# Patient Record
Sex: FEMALE | Race: WHITE | Employment: PART TIME | URBAN - METROPOLITAN AREA
[De-identification: names, ages, dates, MRNs, and addresses within clinical notes are randomized per-mention and may not be internally consistent; named-entity substitution may affect disease eponyms.]

---

## 2017-03-27 ENCOUNTER — OFFICE VISIT (OUTPATIENT)
Dept: SURGERY | Age: 47
End: 2017-03-27

## 2017-03-27 ENCOUNTER — HOSPITAL ENCOUNTER (OUTPATIENT)
Dept: LAB | Age: 47
Discharge: HOME | End: 2017-03-27
Payer: MEDICARE

## 2017-03-27 VITALS
HEART RATE: 72 BPM | DIASTOLIC BLOOD PRESSURE: 68 MMHG | HEIGHT: 66 IN | BODY MASS INDEX: 31.37 KG/M2 | RESPIRATION RATE: 16 BRPM | SYSTOLIC BLOOD PRESSURE: 136 MMHG | WEIGHT: 195.2 LBS

## 2017-03-27 DIAGNOSIS — M35.00 SJOGRENS SYNDROME (HCC): ICD-10-CM

## 2017-03-27 DIAGNOSIS — K90.9 INTESTINAL MALABSORPTION, UNSPECIFIED TYPE: Primary | ICD-10-CM

## 2017-03-27 DIAGNOSIS — I73.00 RAYNAUD'S PHENOMENON WITHOUT GANGRENE: ICD-10-CM

## 2017-03-27 DIAGNOSIS — M79.7 FIBROMYALGIA: ICD-10-CM

## 2017-03-27 DIAGNOSIS — Z98.84 S/P BARIATRIC SURGERY: ICD-10-CM

## 2017-03-27 LAB
ALBUMIN SERPL BCP-MCNC: 3.9 G/DL (ref 3.4–5)
ALBUMIN/GLOB SERPL: 1.1 {RATIO} (ref 0.8–1.7)
ALP SERPL-CCNC: 63 U/L (ref 45–117)
ALT SERPL-CCNC: 18 U/L (ref 13–56)
ANION GAP BLD CALC-SCNC: 9 MMOL/L (ref 3–18)
AST SERPL W P-5'-P-CCNC: 10 U/L (ref 15–37)
BASOPHILS # BLD AUTO: 0 K/UL (ref 0–0.06)
BASOPHILS # BLD: 0 % (ref 0–2)
BILIRUB SERPL-MCNC: 0.3 MG/DL (ref 0.2–1)
BUN SERPL-MCNC: 15 MG/DL (ref 7–18)
BUN/CREAT SERPL: 23 (ref 12–20)
CALCIUM SERPL-MCNC: 8.8 MG/DL (ref 8.5–10.1)
CHLORIDE SERPL-SCNC: 104 MMOL/L (ref 100–108)
CO2 SERPL-SCNC: 29 MMOL/L (ref 21–32)
CREAT SERPL-MCNC: 0.66 MG/DL (ref 0.6–1.3)
DIFFERENTIAL METHOD BLD: ABNORMAL
EOSINOPHIL # BLD: 0 K/UL (ref 0–0.4)
EOSINOPHIL NFR BLD: 1 % (ref 0–5)
ERYTHROCYTE [DISTWIDTH] IN BLOOD BY AUTOMATED COUNT: 17.2 % (ref 11.6–14.5)
FERRITIN SERPL-MCNC: 4 NG/ML (ref 8–388)
FOLATE SERPL-MCNC: >20 NG/ML (ref 3.1–17.5)
GLOBULIN SER CALC-MCNC: 3.7 G/DL (ref 2–4)
GLUCOSE SERPL-MCNC: 122 MG/DL (ref 74–99)
HCT VFR BLD AUTO: 34.6 % (ref 35–45)
HGB BLD-MCNC: 11.1 G/DL (ref 12–16)
IRON SERPL-MCNC: 28 UG/DL (ref 50–175)
LYMPHOCYTES # BLD AUTO: 31 % (ref 21–52)
LYMPHOCYTES # BLD: 2.5 K/UL (ref 0.9–3.6)
MCH RBC QN AUTO: 25.8 PG (ref 24–34)
MCHC RBC AUTO-ENTMCNC: 32.1 G/DL (ref 31–37)
MCV RBC AUTO: 80.5 FL (ref 74–97)
MONOCYTES # BLD: 0.7 K/UL (ref 0.05–1.2)
MONOCYTES NFR BLD AUTO: 8 % (ref 3–10)
NEUTS SEG # BLD: 4.8 K/UL (ref 1.8–8)
NEUTS SEG NFR BLD AUTO: 60 % (ref 40–73)
PLATELET # BLD AUTO: 295 K/UL (ref 135–420)
PMV BLD AUTO: 9.9 FL (ref 9.2–11.8)
POTASSIUM SERPL-SCNC: 3.7 MMOL/L (ref 3.5–5.5)
PROT SERPL-MCNC: 7.6 G/DL (ref 6.4–8.2)
RBC # BLD AUTO: 4.3 M/UL (ref 4.2–5.3)
SODIUM SERPL-SCNC: 142 MMOL/L (ref 136–145)
VIT B12 SERPL-MCNC: 404 PG/ML (ref 211–911)
WBC # BLD AUTO: 8 K/UL (ref 4.6–13.2)

## 2017-03-27 PROCEDURE — 82306 VITAMIN D 25 HYDROXY: CPT | Performed by: SPECIALIST

## 2017-03-27 PROCEDURE — 82728 ASSAY OF FERRITIN: CPT | Performed by: SPECIALIST

## 2017-03-27 PROCEDURE — 85025 COMPLETE CBC W/AUTO DIFF WBC: CPT | Performed by: SPECIALIST

## 2017-03-27 PROCEDURE — 82607 VITAMIN B-12: CPT | Performed by: SPECIALIST

## 2017-03-27 PROCEDURE — 83540 ASSAY OF IRON: CPT | Performed by: SPECIALIST

## 2017-03-27 PROCEDURE — 84425 ASSAY OF VITAMIN B-1: CPT | Performed by: SPECIALIST

## 2017-03-27 PROCEDURE — 36415 COLL VENOUS BLD VENIPUNCTURE: CPT | Performed by: SPECIALIST

## 2017-03-27 PROCEDURE — 80053 COMPREHEN METABOLIC PANEL: CPT | Performed by: SPECIALIST

## 2017-03-27 RX ORDER — ASCORBIC ACID 250 MG
TABLET ORAL
COMMUNITY

## 2017-03-27 RX ORDER — BISMUTH SUBSALICYLATE 262 MG
1 TABLET,CHEWABLE ORAL DAILY
COMMUNITY

## 2017-03-27 RX ORDER — MELATONIN
DAILY
COMMUNITY

## 2017-03-27 RX ORDER — MAGNESIUM 200 MG
1000 TABLET ORAL DAILY
COMMUNITY

## 2017-03-27 RX ORDER — ZOLPIDEM TARTRATE 10 MG/1
TABLET ORAL
COMMUNITY
End: 2017-10-19

## 2017-03-27 NOTE — PROGRESS NOTES
Revision Surgery Consultation    Subjective: The patient is a 40 y.o. obese female with a Body mass index is 31.51 kg/(m^2). Carlos Matamoros The patient had a laparoscopic gastric bypass procedure done approximatly 8 years ago in [place] by Dr. Ronn Lozada. her starting weight prior to surgery was 288. she ultimately lost approximately 128 lbs with a subsequent weight regain of 35lbs. her peak EBWL at 2 years out from surgery was 84% and now her current EBWL is 61%. her last bariatric follow-up was 3 years ago with Dr. Ronn Lozada. Norma Mccoy notes that she had pneumonia  in the immediate post-op phase with a long hospitalization related to that. she currently is having the following issues related to his health: new autoimmune problems. she is here today to discuss some of her weight gain issues. All of their prior evaluations available by both their PCP's and specialists physicians have been reviewed today either in the Care Everywhere portal or scanned under the media tab. I have spent a large portion of my initial consultation today reviewing the patients current dietary habits which have contributed to their health issues, weight regain and  their current obesity. They understand that generally speaking,  weight regain is  a function of resuming less that ideal dietary habits instead of being a procedural issue. They understand that older procedures are more likely to be associated with a less that perfect procedural result, such as a prior vertical banded gastroplasty or non divided gastric bypass. These procedures are more likely to result in staple line failures with resultant weight regain. This has been explained to the patient via diagrams of these older procedures and given to the patient. I have suggested to them personally a dietary regimen that they can initiate now to help with their status as it pertains to their weight.   They understand that the most important aspect of their journey through their weight loss endeavor will be their adherence to a new lifestyle of healthy eating behavior. They also understand that an adherence to an exercise program will not only help with weight loss but is ultimately important in weight maintenance. Patient Active Problem List    Diagnosis Date Noted    Intestinal malabsorption     Fibromyalgia     Raynaud phenomenon     Sjogrens syndrome ([address]     S/P bariatric surgery       Past Surgical History:   Procedure Laterality Date    BREAST SURGERY PROCEDURE UNLISTED      breast reduction    HX GYN      ABEBE single oophorectomy    HX HEENT      tonsils    HX ORTHOPAEDIC      left knee atrthroscopy, Rt rotator cuff, left elbow    LAP GASTRIC BYPASS/REGULO-EN-Y      2/4/2009      Social History   Substance Use Topics    Smoking status: Never Smoker    Smokeless tobacco: Never Used    Alcohol use Yes      Comment: social      No family history on file. Current Outpatient Prescriptions   Medication Sig Dispense Refill    CETIRIZINE HCL (ZYRTEC PO) Take  by mouth.  multivitamin (ONE A DAY) tablet Take 1 Tab by mouth daily.  calcium-cholecalciferol, d3, (CALCIUM 600 + D) 600-125 mg-unit tab Take  by mouth.  ascorbic acid, vitamin C, (VITAMIN C) 250 mg tablet Take  by mouth.  cyanocobalamin (VITAMIN B-12) 1,000 mcg sublingual tablet Take 1,000 mcg by mouth daily.  cholecalciferol (VITAMIN D3) 1,000 unit tablet Take  by mouth daily.  B.infantis-B.ani-B.long-B.bifi (PROBIOTIC 4X) 10-15 mg TbEC Take  by mouth.  zolpidem (AMBIEN) 10 mg tablet Take  by mouth nightly as needed for Sleep.        Allergies   Allergen Reactions    Erythromycin Anaphylaxis    Seldane-D Anaphylaxis     Coma     Sulfa (Sulfonamide Antibiotics) Anaphylaxis     Susana Spencer Syndrome          Review of Systems:            General - No history or complaints of unexpected fever, chills, or weight loss  Head/Neck - No history or complaints of headache, diplopia, dysphagia, hearing loss  Cardiac - No history or complaints of chest pain, palpitations, murmur, or shortness of breath  Pulmonary - No history or complaints of shortness of breath, productive cough, hemoptysis  Gastrointestinal - No history or complaints of reflux,  abdominal pain, obstipation/constipation, blood per rectum  Genitourinary - No history or complaints of hematuria/dysuria, stress urinary incontinence symptoms, or renal lithiasis  Musculoskeletal - No history or complaints of joint pain or muscular weakness  Hematologic - No history or complaints of bleeding disorders, blood transfusions, sickle cell anemia  Neurologic - No history or complaints of  migraine headaches, seizure activity, syncopal episodes, TIA or stroke  Integumentary - No history or complaints of rashes, abnormal nevi, skin cancer  Gynecological - No history of heavy menses/abnormal menses           Objective:     Visit Vitals    /68 (BP 1 Location: Left arm)    Pulse 72    Resp 16    Ht 5' 6\" (1.676 m)    Wt 88.5 kg (195 lb 3.2 oz)    BMI 31.51 kg/m2       Physical Examination: General appearance - alert, well appearing, and in no distress and oriented to person, place, and time  Mental status - alert, oriented to person, place, and time, normal mood, behavior, speech, dress, motor activity, and thought processes  Eyes - pupils equal and reactive, extraocular eye movements intact, sclera anicteric, left eye normal, right eye normal  Ears - bilateral TM's and external ear canals normal, right ear normal, left ear normal  Nose - normal and patent, no erythema, discharge or polyps and normal nontender sinuses  Mouth - mucous membranes moist, pharynx normal without lesions  Neck - supple, no significant adenopathy  Lymphatics - no palpable lymphadenopathy, no hepatosplenomegaly  Chest - clear to auscultation, no wheezes, rales or rhonchi, symmetric air entry  Heart - normal rate, regular rhythm, normal S1, S2, no murmurs, rubs, clicks or gallops  Abdomen - soft, nontender, nondistended, no masses or organomegaly  Back exam - full range of motion, no tenderness, palpable spasm or pain on motion  Neurological - alert, oriented, normal speech, no focal findings or movement disorder noted  Musculoskeletal - no joint tenderness, deformity or swelling  Extremities - peripheral pulses normal, no pedal edema, no clubbing or cyanosis  Skin - normal coloration and turgor, no rashes, no suspicious skin lesions noted    Labs / Old Records:     Lab Results   Component Value Date/Time    WBC 8.4 04/15/2011 12:04 PM    HGB 13.6 04/15/2011 12:04 PM    HCT 42.0 04/15/2011 12:04 PM    PLATELET 385 65/52/8346 12:04 PM    MCV 91.1 04/15/2011 12:04 PM     Lab Results   Component Value Date/Time    Sodium 136 04/15/2011 12:04 PM    Potassium 3.8 04/15/2011 12:04 PM    Chloride 97 04/15/2011 12:04 PM    CO2 30 04/15/2011 12:04 PM    Anion gap 9 04/15/2011 12:04 PM    Glucose 113 04/15/2011 12:04 PM    BUN 13 04/15/2011 12:04 PM    Creatinine 0.8 04/15/2011 12:04 PM    BUN/Creatinine ratio 16 04/15/2011 12:04 PM    GFR est AA >60 04/15/2011 12:04 PM    GFR est non-AA >60 04/15/2011 12:04 PM    Calcium 9.3 04/15/2011 12:04 PM    Bilirubin, total 0.2 05/11/2010 09:45 AM    AST (SGOT) 12 05/11/2010 09:45 AM    Alk.  phosphatase 73 05/11/2010 09:45 AM    Protein, total 7.4 05/11/2010 09:45 AM    Albumin 4.2 05/11/2010 09:45 AM    Globulin 3.2 05/11/2010 09:45 AM    A-G Ratio 1.3 05/11/2010 09:45 AM    ALT (SGPT) 32 05/11/2010 09:45 AM     Lab Results   Component Value Date/Time    Iron 48 05/11/2010 09:45 AM     Lab Results   Component Value Date/Time    Folate 14.9 05/11/2010 09:45 AM     No results found for: Elton Tumtum, XQVID3, Tamiko Lawrence, [id number]          Old operative reports reviewed if available and are scanned under the media tab or reviewed under Care Everywhere        Assessment:     Morbid obesity status post lap gastric bypass procedure approximately 8 years ago with complaint of weight regain. Plan: 1. Weight regain-Today in our office I had a lengthy discussion with Diannia Severe regarding the nature of their prior procedure. We discussed the anatomical changes to their anatomy and how this relates to  contributing weight regain. Our office will continue to attempt to obtain any medical records related to their procedure if we were not able to obtain theme today. It was also discussed today that before any decisions can be made regarding a possible revision of their initial  procedure that an upper GI swallow study must be obtained to evaluate their post surgical anatomy. They understand that the majority of bariatric patients are not revision candidates due to appropriate post surgical anatomy that can not be improved upon. They understand also that if their initial procedure was performed via an open technique that this alone complicates their situation immensely. They understand, as I have explained today, that the adhesive disease associated with prior open procedures is at times a rate limiting factor. This precludes our ability to perform a revision procedure safely. The factors that contribute to this are increased risk such as age, health issues and increased risk from a procedural standpoint and have been discussed today. We will proceed with the UGI swallow study as described above. The patient understands all of the above and wishes to proceed with the study. 2.Nutrition-  I have discussed in detail the pitfalls in diet that have contributed to their weight regain and the importance of adhering to a lifelong regimen of dietary goals and proper eating habits. I have discussed the proper lifelong bariatric diet  in detail spending in excess of 20 minutes discussing this.  We will schedule them for a dietary consultation with our nutritionist and urge them to continue on a regular follow-up schedule with her. 3.Maintenance vitamins- Today we have discussed the importance of vitamins as it pertains to their procedure and we will obtain appropriate lab to check all levels. They have been provided a handout regarding this today. Total time spent with the patient reviewing their complex history of bariatric surgery,diet, and plan is in excess of 60 minutes.       Secondary Diagnoses:         Signed By: Asher Nick MD     March 27, 2017

## 2017-03-27 NOTE — MR AVS SNAPSHOT
Visit Information Date & Time Provider Department Dept. Phone Encounter #  
 3/27/2017  4:00 PM Eliseo Fallon MD [employer] [facility] [phone] [id number] Follow-up Instructions Follow-up and Disposition History Your Appointments 9/27/2017  8:15 AM  
EST PATIENT PROBLEM with Eliseo Fallon MD  
[facility] Appt Note: f/u  
 [address] [place] [address]  
  
   
 [address] [facility] Upcoming Health Maintenance Date Due DTaP/Tdap/Td series (1 - Tdap) 3/31/1992 PAP AKA CERVICAL CYTOLOGY 3/31/1992 INFLUENZA AGE 9 TO ADULT 8/1/2016 Allergies as of 3/27/2017  Review Complete On: 3/27/2017 By: Eliseo Fallon MD  
  
 Severity Noted Reaction Type Reactions Erythromycin High 03/27/2017   Intolerance Anaphylaxis Seldane-d High 03/27/2017   Intolerance Anaphylaxis Coma Sulfa (Sulfonamide Antibiotics) High 03/27/2017   Intolerance Anaphylaxis Arvel Jake Syndrome Current Immunizations  Never Reviewed No immunizations on file. Not reviewed this visit You Were Diagnosed With   
  
 Codes Comments Intestinal malabsorption, unspecified type    -  Primary ICD-10-CM: K90.9 ICD-9-CM: 579.9 Fibromyalgia     ICD-10-CM: M79.7 ICD-9-CM: 729.1 Raynaud's phenomenon without gangrene     ICD-10-CM: I73.00 ICD-9-CM: 443.0 Sjogrens syndrome ([facility]     ICD-10-CM: M35.00 ICD-9-CM: 710.2 S/P bariatric surgery     ICD-10-CM: Z98.84 ICD-9-CM: V45.86 Vitals BP Pulse Resp Height(growth percentile) Weight(growth percentile) BMI  
 136/68 (BP 1 Location: Left arm) 72 16 5' 6\" (1.676 m) 195 lb 3.2 oz (88.5 kg) 31.51 kg/m2 OB Status Smoking Status Hysterectomy Never Smoker Vitals History BMI and BSA Data Body Mass Index Body Surface Area  
 31.51 kg/m 2 2.03 m 2 Your Updated Medication List  
  
   
This list is accurate as of: 3/27/17  4:51 PM.  Always use your most recent med list.  
  
  
  
  
 AMBIEN 10 mg tablet Generic drug:  zolpidem Take  by mouth nightly as needed for Sleep. CALCIUM 600 + D 600-125 mg-unit Tab Generic drug:  calcium-cholecalciferol (d3) Take  by mouth.  
  
 multivitamin tablet Commonly known as:  ONE A DAY Take 1 Tab by mouth daily. PROBIOTIC 4X 10-15 mg Tbec Generic drug:  B.infantis-B.ani-B.long-B.bifi Take  by mouth. VITAMIN B-12 1,000 mcg sublingual tablet Generic drug:  cyanocobalamin Take 1,000 mcg by mouth daily. VITAMIN C 250 mg tablet Generic drug:  ascorbic acid (vitamin C) Take  by mouth. VITAMIN D3 1,000 unit tablet Generic drug:  cholecalciferol Take  by mouth daily. ZYRTEC PO Take  by mouth. To-Do List   
 03/27/2017 Lab:  VITAMIN D, 25 HYDROXY   
  
 05/26/2017 Lab:  CBC WITH AUTOMATED DIFF   
  
 05/26/2017 Lab:  FERRITIN   
  
 05/26/2017 Lab:  IRON   
  
 05/26/2017 Lab:  METABOLIC PANEL, COMPREHENSIVE   
  
 05/26/2017 Lab:  VITAMIN B1, WHOLE BLOOD   
  
 05/26/2017 Lab:  VITAMIN B12 & FOLATE Patient Instructions Patient Instructions 1. Continue to monitor carbohydrate and protein intake- remember to keep your           total  carbohydrates to 50 grams or less per day for best results. 2. Remember hydration goals - usually 48 to 64 ounces of liquids per day 3. Continue to work towards exercise goals - minimum 3 days per week of 45          minutes to  1 hour at a time. 4. Remember to take vitamins as directed Supplement Resource Guide Importance of Protein:  
Maintains lean body mass, produces antibodies to fight off infections, heals wounds, minimizes hair loss, helps to give you energy, helps with satiety, and keeping you full between meals. Importance of Calcium: 
Needed for healthy bones and teeth, normal blood clotting, and nervous system functioning, higher risk of osteoporosis and bone disease with non-compliance. Importance of Multivitamins: Many functions. Supply you with extra nutrients that you may be missing from food. May lead to iron deficiency anemia, weakness, fatigue, and many other symptoms with non-compliance. Importance of B Vitamins: 
Important for red blood cell formation, metabolism, energy, and helps to maintain a healthy nervous system. Protein Supplement Find one you like now. Use immediately after surgery. Look for: 
35-50g protein each day from your protein supplement once you reach the progression diet. 0-3 g fat per serving 0-3 g sugar per serving Protein drinks should be split in separate dosages. Recommend: Lifelong 1 year + Calcium Supplement:  
 
Start taking within a month after surgery. Look for: Calcium Citrate Plus D (1500 mg per day) Recommend: Citracal 
 
 . Avoid chocolate chewable calcium. Can use chewable bariatric or [employer] brand or similar chewable. The body cannot absorb more than 500-600 mg @ a time. Take for Life Multi-vitamin Supplement:   
 
1st Month After Surgery: Any complete chewable, such as: Warrens Complete chewables. Avoid Warren sours or gummies. They lack iron and other important nutrients and also have added sugar. Continue with chewable vitamin or change to adult complete multivitamin one month after surgery. Menstruating women can take a prenatal vitamin. Make sure has at least 18 mg iron and 878-832 mcg folic acid): Vitamin B12, B Complex Vitamin, and Biotin Start taking within a month after surgery. Vitamin B12:  1000 mcg of Vitamin B12 three times weekly Must take sublingually (meaning you take it under your tongue) or in a liquid drop form for easy absorption. B Complex Vitamin: Take a pill or liquid drop form once daily. Biotin: This vitamin can help prevent hair loss. Recommend 5mg  
(5000 mcg) a day Biotin is Optional  
 
 
 
 
  
Introducing [facility]! [employer] introduces Miso patient portal. Now you can access parts of your medical record, email your doctor's office, and request medication refills online. 1. In your internet browser, go to [email or web address] 2. Click on the First Time User? Click Here link in the Sign In box. You will see the New Member Sign Up page. 3. Enter your Miso Access Code exactly as it appears below. You will not need to use this code after youve completed the sign-up process. If you do not sign up before the expiration date, you must request a new code. · Miso Access Code: [email or web address] Expires: 6/25/2017  4:45 PM 
 
4. Enter the last four digits of your Social Security Number (xxxx) and Date of Birth (mm/dd/yyyy) as indicated and click Submit. You will be taken to the next sign-up page. 5. Create a Miso ID. This will be your Miso login ID and cannot be changed, so think of one that is secure and easy to remember. 6. Create a Miso password. You can change your password at any time. 7. Enter your Password Reset Question and Answer. This can be used at a later time if you forget your password. 8. Enter your e-mail address. You will receive e-mail notification when new information is available in [address] 9. Click Sign Up. You can now view and download portions of your medical record. 10. Click the Download Summary menu link to download a portable copy of your medical information. If you have questions, please visit the Frequently Asked Questions section of the Miso website.  Remember, Miso is NOT to be used for urgent needs. For medical emergencies, dial 911. Now available from your iPhone and Android! Please provide this summary of care documentation to your next provider. Your primary care clinician is listed as Balta Jordan. If you have any questions after today's visit, please call [phone].

## 2017-03-27 NOTE — PATIENT INSTRUCTIONS

## 2017-03-28 LAB — 25(OH)D3 SERPL-MCNC: 25.8 NG/ML (ref 30–100)

## 2017-03-29 LAB — VIT B1 BLD-SCNC: 165.2 NMOL/L (ref 66.5–200)

## 2017-03-31 NOTE — PROGRESS NOTES
Call in Vitamin D 50,000 units twice weekly for 1 year  Also have patient see Dr Chastity Eli for iron infusion

## 2017-04-05 ENCOUNTER — APPOINTMENT (OUTPATIENT)
Dept: GENERAL RADIOLOGY | Age: 47
End: 2017-04-05
Attending: SPECIALIST
Payer: MEDICARE

## 2017-04-05 ENCOUNTER — HOSPITAL ENCOUNTER (OUTPATIENT)
Age: 47
Setting detail: OUTPATIENT SURGERY
Discharge: HOME | End: 2017-04-05
Attending: SPECIALIST | Admitting: SPECIALIST
Payer: MEDICARE

## 2017-04-05 VITALS
SYSTOLIC BLOOD PRESSURE: 134 MMHG | TEMPERATURE: 98.1 F | BODY MASS INDEX: 31.05 KG/M2 | DIASTOLIC BLOOD PRESSURE: 77 MMHG | WEIGHT: 193.2 LBS | OXYGEN SATURATION: 100 % | HEIGHT: 66 IN | HEART RATE: 58 BPM | RESPIRATION RATE: 16 BRPM

## 2017-04-05 DIAGNOSIS — E66.01 MORBID OBESITY (HCC): ICD-10-CM

## 2017-04-05 PROCEDURE — 76040000019: Performed by: SPECIALIST

## 2017-04-05 PROCEDURE — 74011000255 HC RX REV CODE- 255: Performed by: SPECIALIST

## 2017-04-05 PROCEDURE — 74240 X-RAY XM UPR GI TRC 1CNTRST: CPT

## 2017-04-05 RX ORDER — ERGOCALCIFEROL 1.25 MG/1
50000 CAPSULE ORAL 2 TIMES WEEKLY
Qty: 52 CAP | Refills: 1 | Status: SHIPPED | OUTPATIENT
Start: 2017-04-07 | End: 2017-10-04

## 2017-04-05 NOTE — IP AVS SNAPSHOT
Current Discharge Medication List  
  
ASK your doctor about these medications Dose & Instructions Dispensing Information Comments Morning Noon Evening Bedtime AMBIEN 10 mg tablet Generic drug:  zolpidem Your last dose was: Your next dose is: Take  by mouth nightly as needed for Sleep. Refills:  0  
     
   
   
   
  
 CALCIUM 600 + D 600-125 mg-unit Tab Generic drug:  calcium-cholecalciferol (d3) Your last dose was: Your next dose is: Take  by mouth. Refills:  0  
     
   
   
   
  
 ergocalciferol 50,000 unit capsule Commonly known as:  ERGOCALCIFEROL Start taking on:  4/7/2017 Your last dose was: Your next dose is:    
   
   
 Dose:  04714 Units Take 1 Cap by mouth two (2) times a week for 180 days. Quantity:  52 Cap Refills:  1  
     
   
   
   
  
 multivitamin tablet Commonly known as:  ONE A DAY Your last dose was: Your next dose is:    
   
   
 Dose:  1 Tab Take 1 Tab by mouth daily. Refills:  0 PROBIOTIC 4X 10-15 mg Tbec Generic drug:  B.infantis-B.ani-B.long-B.bifi Your last dose was: Your next dose is: Take  by mouth. Refills:  0  
     
   
   
   
  
 VITAMIN B-12 1,000 mcg sublingual tablet Generic drug:  cyanocobalamin Your last dose was: Your next dose is:    
   
   
 Dose:  1000 mcg Take 1,000 mcg by mouth daily. Refills:  0  
     
   
   
   
  
 VITAMIN C 250 mg tablet Generic drug:  ascorbic acid (vitamin C) Your last dose was: Your next dose is: Take  by mouth. Refills:  0  
     
   
   
   
  
 VITAMIN D3 1,000 unit tablet Generic drug:  cholecalciferol Your last dose was: Your next dose is: Take  by mouth daily. Refills:  0 ZYRTEC PO Your last dose was: Your next dose is: Take  by mouth. Refills:  0 Where to Get Your Medications These medications were sent to [address] [place] [address], [address] [place] [address] 2 [facility], [address] [place] [id number] Phone:  [phone]  
  ergocalciferol 50,000 unit capsule

## 2017-04-05 NOTE — PROCEDURES
8 years post Damien Ramirez gastric bypass at 61% wt loss. Generous pouch but BMI 31.   See dictation

## 2017-04-05 NOTE — IP AVS SNAPSHOT
Clau Vargas 
 
 
 [address] [facility] [address] 
[phone] Patient: Benji Greenwood MRN: [id number] [id number] You are allergic to the following Allergen Reactions Erythromycin Anaphylaxis Seldane-D Anaphylaxis Coma Sulfa (Sulfonamide Antibiotics) Anaphylaxis Janece Jersey Syndrome Recent Documentation Height Weight BMI OB Status Smoking Status 1.676 m 87.6 kg 31.18 kg/m2 Hysterectomy Never Smoker Emergency Contacts Name Discharge Info Relation Home Work Mobile [address] Susi Cisneros  Parent [1] [phone] Moy Portillo DISCHARGE CAREGIVER [3] Spouse [3] [phone] About your hospitalization You were admitted on:  April 5, 2017 You last received care in the:  [facility] ENDOSCOPY You were discharged on:  April 5, 2017 Unit phone number:  [phone] Why you were hospitalized Your primary diagnosis was:  Not on File Providers Seen During Your Hospitalizations Provider Role Specialty Primary office phone Ronne Barthel, MD Attending Provider General Surgery [phone] Your Primary Care Physician (PCP) Primary Care Physician Office Phone Office Fax Eun Keating [phone] Follow-up Information None Current Discharge Medication List  
  
ASK your doctor about these medications Dose & Instructions Dispensing Information Comments Morning Noon Evening Bedtime AMBIEN 10 mg tablet Generic drug:  zolpidem Your last dose was: Your next dose is: Take  by mouth nightly as needed for Sleep. Refills:  0  
     
   
   
   
  
 CALCIUM 600 + D 600-125 mg-unit Tab Generic drug:  calcium-cholecalciferol (d3) Your last dose was: Your next dose is: Take  by mouth. Refills:  0  
     
   
   
   
  
 ergocalciferol 50,000 unit capsule Commonly known as:  ERGOCALCIFEROL Start taking on:  4/7/2017 Your last dose was: Your next dose is:    
   
   
 Dose:  [address] Units Take 1 Cap by mouth two (2) times a week for 180 days. Quantity:  52 Cap Refills:  1  
     
   
   
   
  
 multivitamin tablet Commonly known as:  ONE A DAY Your last dose was: Your next dose is:    
   
   
 Dose:  1 Tab Take 1 Tab by mouth daily. Refills:  0 PROBIOTIC 4X 10-15 mg Tbec Generic drug:  B.infantis-B.ani-B.long-B.bifi Your last dose was: Your next dose is: Take  by mouth. Refills:  0  
     
   
   
   
  
 VITAMIN B-12 1,000 mcg sublingual tablet Generic drug:  cyanocobalamin Your last dose was: Your next dose is:    
   
   
 Dose:  1000 mcg Take 1,000 mcg by mouth daily. Refills:  0  
     
   
   
   
  
 VITAMIN C 250 mg tablet Generic drug:  ascorbic acid (vitamin C) Your last dose was: Your next dose is: Take  by mouth. Refills:  0  
     
   
   
   
  
 VITAMIN D3 1,000 unit tablet Generic drug:  cholecalciferol Your last dose was: Your next dose is: Take  by mouth daily. Refills:  0 ZYRTEC PO Your last dose was: Your next dose is: Take  by mouth. Refills:  0 Where to Get Your Medications These medications were sent to [address] [place] [id number] Phone:  [phone]  
  ergocalciferol 50,000 unit capsule Discharge Instructions None Discharge Orders None Introducing [facility]! Talha Baker introduces datango patient portal. Now you can access parts of your medical record, email your doctor's office, and request medication refills online. 1. In your internet browser, go to [email or web address] 2. Click on the First Time User? Click Here link in the Sign In box. You will see the New Member Sign Up page. 3. Enter your Padlet Access Code exactly as it appears below. You will not need to use this code after youve completed the sign-up process. If you do not sign up before the expiration date, you must request a new code. · Padlet Access Code: [email or web address] Expires: 6/25/2017  4:45 PM 
 
4. Enter the last four digits of your Social Security Number (xxxx) and Date of Birth (mm/dd/yyyy) as indicated and click Submit. You will be taken to the next sign-up page. 5. Create a Padlet ID. This will be your Padlet login ID and cannot be changed, so think of one that is secure and easy to remember. 6. Create a Padlet password. You can change your password at any time. 7. Enter your Password Reset Question and Answer. This can be used at a later time if you forget your password. 8. Enter your e-mail address. You will receive e-mail notification when new information is available in [address] 9. Click Sign Up. You can now view and download portions of your medical record. 10. Click the Download Summary menu link to download a portable copy of your medical information. If you have questions, please visit the Frequently Asked Questions section of the Padlet website. Remember, Padlet is NOT to be used for urgent needs. For medical emergencies, dial 911. Now available from your iPhone and Android! General Information Please provide this summary of care documentation to your next provider. Patient Signature:  ____________________________________________________________ Date:  ____________________________________________________________  
  
Vidales Carlos Provider Signature:  ____________________________________________________________ Date:  ____________________________________________________________

## 2017-04-06 ENCOUNTER — TELEPHONE (OUTPATIENT)
Dept: SURGERY | Age: 47
End: 2017-04-06

## 2017-04-06 NOTE — TELEPHONE ENCOUNTER
Patient advised and Pharmacy verified Vit. D.  Also made appointment with Dr. James Fleming, see , Dr. Robson Bustos no longer taking new patient's. LMM. Records faxed.

## 2017-04-06 NOTE — OP NOTES
[address] [place] [address]  OPERATIVE REPORT    Name:  Jossue Linton  MR#:  [id number]  :  1970  Account #:  [de-identified]  Date of Adm:  2017  Date of Surgery:  2017      PREPROCEDURE DIAGNOSIS: The patient is 8 years out from  gastric bypass with weight regain. POSTPROCEDURE DIAGNOSIS: The patient is 8 years out from  gastric bypass with weight regain. PROCEDURES PERFORMED: Upper gastrointestinal study with  barium. ESTIMATED BLOOD LOSS: None. SPECIMENS: None. ANESTHESIA: None. STATEMENT OF MEDICAL NECESSITY: The patient is a 71-year-old  female who 8 years ago underwent a gastric [place]. The patient did well with her weight loss and is  maintaining at about 61% excess body weight loss currently. She is concerned about weight gain. She presented to me for long-term  followup and possible revision surgery. She is here today for upper GI  study to assess her gastric pouch. DESCRIPTION OF PROCEDURE: The patient was brought to the  fluoroscopy unit where she was given thin barium. On swallowing of  barium, she was noted to have normal peristalsis of her esophagus. She had prompt filling of the distal esophagus with tapering into and  through the gastroesophageal junction. Contrast then filled the gastric  pouch, which was vertically oriented. The gastric pouch was slightly  generous, instead of being about 25-30 mL total volume, it was more  likely in the range of 40-45 mL or so in volume. Contrast flowed through  the gastrojejunal anastomosis in normal fashion and the Lona limb  was nonobstructed. At this juncture, really given her findings, she is not  a candidate for any type of revision of her gastric bypass as her pouch  really is not large enough for such.  Her maintenance of her weight loss  is appropriate and although she would like to lose about 30 pounds,  she would be really back in good position from her weight, which is  about 15-20 pounds weight loss and will have her pursue medical  weight loss plan in our office. She is to undergo some lab work just for  routine followup also.         Marcos Ballard MD    AT / CD  D:  04/05/2017   18:26  T:  04/06/2017   04:55  Job #:  [id number]

## 2017-04-06 NOTE — TELEPHONE ENCOUNTER
----- Message from Faye Pascual MD sent at 3/31/2017  6:04 PM EDT -----  Call in Vitamin D 50,000 units twice weekly for 1 year  Also have patient see Dr Ramiro Santiago for iron infusion

## 2017-04-25 ENCOUNTER — NURSE NAVIGATOR (OUTPATIENT)
Dept: SURGERY | Age: 47
End: 2017-04-25

## 2017-05-05 ENCOUNTER — NURSE NAVIGATOR (OUTPATIENT)
Dept: SURGERY | Age: 47
End: 2017-05-05

## 2017-09-19 ENCOUNTER — HOSPITAL ENCOUNTER (OUTPATIENT)
Dept: LAB | Age: 47
Discharge: HOME | End: 2017-09-19
Payer: MEDICARE

## 2017-09-19 PROCEDURE — 80307 DRUG TEST PRSMV CHEM ANLYZR: CPT

## 2017-09-25 LAB
Lab: NORMAL
REFERENCE LAB,REFLB: NORMAL
TEST DESCRIPTION:,ATST: NORMAL

## 2017-09-27 ENCOUNTER — HOSPITAL ENCOUNTER (OUTPATIENT)
Age: 47
Discharge: HOME | End: 2017-09-27
Attending: ORTHOPAEDIC SURGERY
Payer: MEDICARE

## 2017-09-27 DIAGNOSIS — M75.102 TEAR OF LEFT ROTATOR CUFF, UNSPECIFIED TEAR EXTENT: ICD-10-CM

## 2017-09-27 PROCEDURE — 73221 MRI JOINT UPR EXTREM W/O DYE: CPT

## 2017-10-19 ENCOUNTER — HOSPITAL ENCOUNTER (EMERGENCY)
Age: 47
Discharge: HOME | End: 2017-10-20
Attending: EMERGENCY MEDICINE
Payer: MEDICARE

## 2017-10-19 VITALS
HEIGHT: 66 IN | BODY MASS INDEX: 29.73 KG/M2 | DIASTOLIC BLOOD PRESSURE: 81 MMHG | HEART RATE: 65 BPM | OXYGEN SATURATION: 100 % | SYSTOLIC BLOOD PRESSURE: 137 MMHG | RESPIRATION RATE: 20 BRPM | TEMPERATURE: 98.4 F | WEIGHT: 185 LBS

## 2017-10-19 DIAGNOSIS — K02.9 DENTAL CARIES: Primary | ICD-10-CM

## 2017-10-19 DIAGNOSIS — R11.2 NON-INTRACTABLE VOMITING WITH NAUSEA, UNSPECIFIED VOMITING TYPE: ICD-10-CM

## 2017-10-19 LAB
ALBUMIN SERPL-MCNC: 3.9 G/DL (ref 3.4–5)
ALBUMIN/GLOB SERPL: 0.9 {RATIO} (ref 0.8–1.7)
ALP SERPL-CCNC: 81 U/L (ref 45–117)
ALT SERPL-CCNC: 22 U/L (ref 13–56)
ANION GAP SERPL CALC-SCNC: 10 MMOL/L (ref 3–18)
AST SERPL-CCNC: 15 U/L (ref 15–37)
BASOPHILS # BLD: 0 K/UL (ref 0–0.06)
BASOPHILS NFR BLD: 0 % (ref 0–2)
BILIRUB DIRECT SERPL-MCNC: 0.1 MG/DL (ref 0–0.2)
BILIRUB SERPL-MCNC: 0.6 MG/DL (ref 0.2–1)
BUN SERPL-MCNC: 11 MG/DL (ref 7–18)
BUN/CREAT SERPL: 16 (ref 12–20)
CALCIUM SERPL-MCNC: 9.7 MG/DL (ref 8.5–10.1)
CHLORIDE SERPL-SCNC: 99 MMOL/L (ref 100–108)
CO2 SERPL-SCNC: 30 MMOL/L (ref 21–32)
CREAT SERPL-MCNC: 0.69 MG/DL (ref 0.6–1.3)
DIFFERENTIAL METHOD BLD: ABNORMAL
EOSINOPHIL # BLD: 0 K/UL (ref 0–0.4)
EOSINOPHIL NFR BLD: 0 % (ref 0–5)
ERYTHROCYTE [DISTWIDTH] IN BLOOD BY AUTOMATED COUNT: 15.1 % (ref 11.6–14.5)
GLOBULIN SER CALC-MCNC: 4.2 G/DL (ref 2–4)
GLUCOSE SERPL-MCNC: 121 MG/DL (ref 74–99)
HCT VFR BLD AUTO: 40.8 % (ref 35–45)
HGB BLD-MCNC: 12.7 G/DL (ref 12–16)
LIPASE SERPL-CCNC: 48 U/L (ref 73–393)
LYMPHOCYTES # BLD: 1.5 K/UL (ref 0.9–3.6)
LYMPHOCYTES NFR BLD: 11 % (ref 21–52)
MCH RBC QN AUTO: 26 PG (ref 24–34)
MCHC RBC AUTO-ENTMCNC: 31.1 G/DL (ref 31–37)
MCV RBC AUTO: 83.4 FL (ref 74–97)
MONOCYTES # BLD: 1.3 K/UL (ref 0.05–1.2)
MONOCYTES NFR BLD: 10 % (ref 3–10)
NEUTS SEG # BLD: 11 K/UL (ref 1.8–8)
NEUTS SEG NFR BLD: 79 % (ref 40–73)
PLATELET # BLD AUTO: 302 K/UL (ref 135–420)
PMV BLD AUTO: 10.4 FL (ref 9.2–11.8)
POTASSIUM SERPL-SCNC: 3.7 MMOL/L (ref 3.5–5.5)
PROT SERPL-MCNC: 8.1 G/DL (ref 6.4–8.2)
RBC # BLD AUTO: 4.89 M/UL (ref 4.2–5.3)
SODIUM SERPL-SCNC: 139 MMOL/L (ref 136–145)
WBC # BLD AUTO: 13.8 K/UL (ref 4.6–13.2)

## 2017-10-19 PROCEDURE — 99283 EMERGENCY DEPT VISIT LOW MDM: CPT

## 2017-10-19 PROCEDURE — 80076 HEPATIC FUNCTION PANEL: CPT | Performed by: EMERGENCY MEDICINE

## 2017-10-19 PROCEDURE — 96374 THER/PROPH/DIAG INJ IV PUSH: CPT

## 2017-10-19 PROCEDURE — 74011250636 HC RX REV CODE- 250/636: Performed by: EMERGENCY MEDICINE

## 2017-10-19 PROCEDURE — 96375 TX/PRO/DX INJ NEW DRUG ADDON: CPT

## 2017-10-19 PROCEDURE — 83690 ASSAY OF LIPASE: CPT | Performed by: EMERGENCY MEDICINE

## 2017-10-19 PROCEDURE — 80048 BASIC METABOLIC PNL TOTAL CA: CPT | Performed by: EMERGENCY MEDICINE

## 2017-10-19 PROCEDURE — 85025 COMPLETE CBC W/AUTO DIFF WBC: CPT | Performed by: EMERGENCY MEDICINE

## 2017-10-19 PROCEDURE — 96376 TX/PRO/DX INJ SAME DRUG ADON: CPT

## 2017-10-19 PROCEDURE — 96361 HYDRATE IV INFUSION ADD-ON: CPT

## 2017-10-19 RX ORDER — ONDANSETRON 2 MG/ML
4 INJECTION INTRAMUSCULAR; INTRAVENOUS
Status: COMPLETED | OUTPATIENT
Start: 2017-10-19 | End: 2017-10-19

## 2017-10-19 RX ORDER — KETOROLAC TROMETHAMINE 30 MG/ML
30 INJECTION, SOLUTION INTRAMUSCULAR; INTRAVENOUS
Status: COMPLETED | OUTPATIENT
Start: 2017-10-19 | End: 2017-10-19

## 2017-10-19 RX ORDER — SODIUM CHLORIDE 9 MG/ML
1000 INJECTION, SOLUTION INTRAVENOUS ONCE
Status: COMPLETED | OUTPATIENT
Start: 2017-10-19 | End: 2017-10-20

## 2017-10-19 RX ORDER — MINERAL OIL
180 ENEMA (ML) RECTAL DAILY
COMMUNITY
End: 2022-05-11

## 2017-10-19 RX ADMIN — ONDANSETRON 4 MG: 2 INJECTION INTRAMUSCULAR; INTRAVENOUS at 23:26

## 2017-10-19 RX ADMIN — SODIUM CHLORIDE 1000 ML: 900 INJECTION, SOLUTION INTRAVENOUS at 23:25

## 2017-10-19 RX ADMIN — KETOROLAC TROMETHAMINE 30 MG: 30 INJECTION, SOLUTION INTRAMUSCULAR at 23:26

## 2017-10-20 PROCEDURE — 74011250636 HC RX REV CODE- 250/636: Performed by: EMERGENCY MEDICINE

## 2017-10-20 PROCEDURE — 74011250637 HC RX REV CODE- 250/637: Performed by: EMERGENCY MEDICINE

## 2017-10-20 RX ORDER — CLINDAMYCIN HYDROCHLORIDE 150 MG/1
300 CAPSULE ORAL
Status: COMPLETED | OUTPATIENT
Start: 2017-10-20 | End: 2017-10-20

## 2017-10-20 RX ORDER — ONDANSETRON 4 MG/1
4 TABLET, ORALLY DISINTEGRATING ORAL
Qty: 14 TAB | Refills: 0 | Status: SHIPPED | OUTPATIENT
Start: 2017-10-20 | End: 2022-05-11

## 2017-10-20 RX ORDER — CLINDAMYCIN HYDROCHLORIDE 300 MG/1
300 CAPSULE ORAL 2 TIMES DAILY
Qty: 14 CAP | Refills: 0 | Status: SHIPPED | OUTPATIENT
Start: 2017-10-20 | End: 2017-10-27

## 2017-10-20 RX ORDER — OXYCODONE AND ACETAMINOPHEN 5; 325 MG/1; MG/1
1 TABLET ORAL
Qty: 14 TAB | Refills: 0 | Status: SHIPPED | OUTPATIENT
Start: 2017-10-20 | End: 2022-05-11

## 2017-10-20 RX ORDER — ONDANSETRON 2 MG/ML
4 INJECTION INTRAMUSCULAR; INTRAVENOUS
Status: COMPLETED | OUTPATIENT
Start: 2017-10-20 | End: 2017-10-20

## 2017-10-20 RX ADMIN — ONDANSETRON 4 MG: 2 INJECTION INTRAMUSCULAR; INTRAVENOUS at 00:47

## 2017-10-20 RX ADMIN — CLINDAMYCIN HYDROCHLORIDE 300 MG: 150 CAPSULE ORAL at 00:19

## 2017-10-20 NOTE — DISCHARGE INSTRUCTIONS
Return for any new or worsening pain, any shortness of breath, vomiting, decreased fluid intake, weakness, numbness, dizziness, or any change or concerns. Nausea and Vomiting: Care Instructions  Your Care Instructions    When you are nauseated, you may feel weak and sweaty and notice a lot of saliva in your mouth. Nausea often leads to vomiting. Most of the time you do not need to worry about nausea and vomiting, but they can be signs of other illnesses. Two common causes of nausea and vomiting are stomach flu and food poisoning. Nausea and vomiting from viral stomach flu will usually start to improve within 24 hours. Nausea and vomiting from food poisoning may last from 12 to 48 hours. The doctor has checked you carefully, but problems can develop later. If you notice any problems or new symptoms, get medical treatment right away. Follow-up care is a key part of your treatment and safety. Be sure to make and go to all appointments, and call your doctor if you are having problems. It's also a good idea to know your test results and keep a list of the medicines you take. How can you care for yourself at home? · To prevent dehydration, drink plenty of fluids, enough so that your urine is light yellow or clear like water. Choose water and other caffeine-free clear liquids until you feel better. If you have kidney, heart, or liver disease and have to limit fluids, talk with your doctor before you increase the amount of fluids you drink. · Rest in bed until you feel better. · When you are able to eat, try clear soups, mild foods, and liquids until all symptoms are gone for 12 to 48 hours. Other good choices include dry toast, crackers, cooked cereal, and gelatin dessert, such as Jell-O. When should you call for help? Call 911 anytime you think you may need emergency care. For example, call if:  · You passed out (lost consciousness).   Call your doctor now or seek immediate medical care if:  · You have symptoms of dehydration, such as:  ¨ Dry eyes and a dry mouth. ¨ Passing only a little dark urine. ¨ Feeling thirstier than usual.  · You have new or worsening belly pain. · You have a new or higher fever. · You vomit blood or what looks like coffee grounds. Watch closely for changes in your health, and be sure to contact your doctor if:  · You have ongoing nausea and vomiting. · Your vomiting is getting worse. · Your vomiting lasts longer than 2 days. · You are not getting better as expected. Where can you learn more? Go to http://silvina-gifty.info/. Enter 25 [id number] in the search box to learn more about \"Nausea and Vomiting: Care Instructions. \"  Current as of: March 20, 2017  Content Version: 11.3  © 6971-4181 [employer]. Care instructions adapted under license by [employer] (which disclaims liability or warranty for this information). If you have questions about a medical condition or this instruction, always ask your healthcare professional. [place] [address] any warranty or liability for your use of this information.

## 2017-10-20 NOTE — ED PROVIDER NOTES
HPI Comments: 10:53 PM Era Cai is a 56 y.o. female with history of Sjogren's Syndrome and Gastric Bypass who presents to the ED c/o dental pain and vomiting. Pt is having pain in tooth #14 with mild facial swelling. Pt reports that she saw her dentist on 10/11/2017 and was put on Amoxicillin for 10 days. Pt also notes nausea and vomiting x6 episodes. Pt describes episodes as \"dry-heaves\" with some fluid intermittently. Pt has been taking Vicodin for pain. Percocet 2 hours PTA. Pt notes that she has a root canal scheduled for 10/23/2017. Pt denies abdominal pain, fever, chills or any other acute sx at this time. PCP: Clifford Oconnor MD (Inactive)      The history is provided by the patient. No  was used. Past Medical History:   Diagnosis Date    Fibromyalgia     Intestinal malabsorption     Raynaud phenomenon     S/P bariatric surgery     Sjogrens syndrome ([facility] [address]        Past Surgical History:   Procedure Laterality Date    BREAST SURGERY PROCEDURE UNLISTED      breast reduction    HX GYN      ABEBE single oophorectomy    HX HEENT      tonsils    HX ORTHOPAEDIC      left knee atrthroscopy, Rt rotator cuff, left elbow    LAP GASTRIC BYPASS/REGULO-EN-Y      2/4/2009         History reviewed. No pertinent family history. Social History     Social History    Marital status:      Spouse name: N/A    Number of children: N/A    Years of education: N/A     Occupational History    Not on file. Social History Main Topics    Smoking status: Never Smoker    Smokeless tobacco: Never Used    Alcohol use Yes      Comment: social    Drug use: No    Sexual activity: Yes     Partners: Male     Birth control/ protection: Surgical     Other Topics Concern    Not on file     Social History Narrative         ALLERGIES: Erythromycin; Seldane-d; and Sulfa (sulfonamide antibiotics)    Review of Systems   Constitutional: Negative for fever. HENT: Positive for dental problem. Negative for congestion. Respiratory: Negative for cough and shortness of breath. Cardiovascular: Negative for chest pain. Gastrointestinal: Positive for nausea and vomiting. Negative for abdominal pain. Musculoskeletal: Negative for back pain. Skin: Negative for rash. Neurological: Negative for light-headedness. All other systems reviewed and are negative. Vitals:    10/19/17 2220   BP: 137/81   Pulse: 65   Resp: 20   Temp: 98.4 °F (36.9 °C)   SpO2: 100%   Weight: 83.9 kg (185 lb)   Height: 5' 6\" (1.676 m)            Physical Exam   Constitutional: She is oriented to person, place, and time. She appears well-developed. HENT:   Head: Normocephalic. Mouth/Throat: Oropharynx is clear and moist.   Tooth #14 ttp with minimal swelling. Mild erythema. No induration. Eyes: Pupils are equal, round, and reactive to light. Neck: Normal range of motion. Cardiovascular: Normal rate and normal heart sounds. No murmur heard. Pulmonary/Chest: Effort normal. She has no wheezes. She has no rales. Abdominal: Soft. There is no tenderness. Musculoskeletal: Normal range of motion. She exhibits no edema. Neurological: She is alert and oriented to person, place, and time. Skin: Skin is warm and dry. Nursing note and vitals reviewed.        MDM  Number of Diagnoses or Management Options    ED Course       Procedures               Vitals:  Patient Vitals for the past 12 hrs:   Temp Pulse Resp BP SpO2   10/19/17 2220 98.4 °F (36.9 °C) 65 20 137/81 100 %         Medications ordered:   Medications   0.9% sodium chloride infusion 1,000 mL (0 mL IntraVENous IV Completed 10/20/17 0025)   ketorolac (TORADOL) injection 30 mg (30 mg IntraVENous Given 10/19/17 2326)   ondansetron (ZOFRAN) injection 4 mg (4 mg IntraVENous Given 10/19/17 2326)   clindamycin (CLEOCIN) capsule 300 mg (300 mg Oral Given 10/20/17 0019)   ondansetron (ZOFRAN) injection 4 mg (4 mg IntraVENous Given 10/20/17 0047)         Lab findings:  Recent Results (from the past 12 hour(s))   CBC WITH AUTOMATED DIFF    Collection Time: 10/19/17 11:20 PM   Result Value Ref Range    WBC 13.8 (H) 4.6 - 13.2 K/uL    RBC 4.89 4.20 - 5.30 M/uL    HGB 12.7 12.0 - 16.0 g/dL    HCT 40.8 35.0 - 45.0 %    MCV 83.4 74.0 - 97.0 FL    MCH 26.0 24.0 - 34.0 PG    MCHC 31.1 31.0 - 37.0 g/dL    RDW 15.1 (H) 11.6 - 14.5 %    PLATELET 794 542 - 316 K/uL    MPV 10.4 9.2 - 11.8 FL    NEUTROPHILS 79 (H) 40 - 73 %    LYMPHOCYTES 11 (L) 21 - 52 %    MONOCYTES 10 3 - 10 %    EOSINOPHILS 0 0 - 5 %    BASOPHILS 0 0 - 2 %    ABS. NEUTROPHILS 11.0 (H) 1.8 - 8.0 K/UL    ABS. LYMPHOCYTES 1.5 0.9 - 3.6 K/UL    ABS. MONOCYTES 1.3 (H) 0.05 - 1.2 K/UL    ABS. EOSINOPHILS 0.0 0.0 - 0.4 K/UL    ABS. BASOPHILS 0.0 0.0 - 0.06 K/UL    DF AUTOMATED     METABOLIC PANEL, BASIC    Collection Time: 10/19/17 11:20 PM   Result Value Ref Range    Sodium 139 136 - 145 mmol/L    Potassium 3.7 3.5 - 5.5 mmol/L    Chloride 99 (L) 100 - 108 mmol/L    CO2 30 21 - 32 mmol/L    Anion gap 10 3.0 - 18 mmol/L    Glucose 121 (H) 74 - 99 mg/dL    BUN 11 7.0 - 18 MG/DL    Creatinine 0.69 0.6 - 1.3 MG/DL    BUN/Creatinine ratio 16 12 - 20      GFR est AA >60 >60 ml/min/1.73m2    GFR est non-AA >60 >60 ml/min/1.73m2    Calcium 9.7 8.5 - 10.1 MG/DL   LIPASE    Collection Time: 10/19/17 11:20 PM   Result Value Ref Range    Lipase 48 (L) 73 - 393 U/L   HEPATIC FUNCTION PANEL    Collection Time: 10/19/17 11:20 PM   Result Value Ref Range    Protein, total 8.1 6.4 - 8.2 g/dL    Albumin 3.9 3.4 - 5.0 g/dL    Globulin 4.2 (H) 2.0 - 4.0 g/dL    A-G Ratio 0.9 0.8 - 1.7      Bilirubin, total 0.6 0.2 - 1.0 MG/DL    Bilirubin, direct 0.1 0.0 - 0.2 MG/DL    Alk. phosphatase 81 45 - 117 U/L    AST (SGOT) 15 15 - 37 U/L    ALT (SGPT) 22 13 - 56 U/L           X-Ray, CT or other radiology findings or impressions:  No orders to display       Progress notes, Consult notes or additional Procedure notes:   11:54 PM Pt feeling better. No complaints. Not c/w abscess/bacteremia/sepsis. Stable for dc and close f/u. Det ret inst given. No emc. Disposition:  Diagnosis:   1. Dental caries    2. Non-intractable vomiting with nausea, unspecified vomiting type        Disposition: Discharged. Follow-up Information     Follow up With Details Comments Marci De La Rosa MD Schedule an appointment as soon as possible for a visit in 1 day and your dentist [address]  [address] [facility]             Discharge Medication List as of 10/20/2017 12:10 AM      START taking these medications    Details   clindamycin (CLEOCIN) 300 mg capsule Take 1 Cap by mouth two (2) times a day for 7 days. , Print, Disp-14 Cap, R-0      oxyCODONE-acetaminophen (PERCOCET) 5-325 mg per tablet Take 1 Tab by mouth every six (6) hours as needed for Pain. Max Daily Amount: 4 Tabs., Print, Disp-14 Tab, R-0      ondansetron (ZOFRAN ODT) 4 mg disintegrating tablet Take 1 Tab by mouth every eight (8) hours as needed for Nausea. , Print, Disp-14 Tab, R-0         CONTINUE these medications which have NOT CHANGED    Details   fexofenadine (ALLEGRA) 180 mg tablet Take 180 mg by mouth daily. , Historical Med      multivitamin (ONE A DAY) tablet Take 1 Tab by mouth daily. , Historical Med      calcium-cholecalciferol, d3, (CALCIUM 600 + D) 600-125 mg-unit tab Take  by mouth., Historical Med      ascorbic acid, vitamin C, (VITAMIN C) 250 mg tablet Take  by mouth., Historical Med      cyanocobalamin (VITAMIN B-12) 1,000 mcg sublingual tablet Take 1,000 mcg by mouth daily. , Historical Med      cholecalciferol (VITAMIN D3) 1,000 unit tablet Take  by mouth daily. , Historical Med      B.infantis-B.ani-B.long-B.bifi (PROBIOTIC 4X) 10-15 mg TbEC Take  by mouth., Historical Med                             Scribe [facility]              [employer] acting as a scribe for and in the presence of Georges Kruger, MD              October 19, 2017 at 10:53 PM                            Provider Attestation:              I personally performed the services described in the documentation, reviewed the documentation, as recorded by the scribe in my presence, and it accurately and completely records my words and actions.  October 19, 2017 at 10:53 PM - Jose Bowen MD

## 2017-10-20 NOTE — ED TRIAGE NOTES
C/o dental abscess, scheduled for root canal on Tuesday. Took antibiotic (amoxicillin 500mg) 10/11-10/19, took last dose today. States left facial swelling, pain progressively worse this afternoon thru tonight. Pt states dry heaves. Has taken Vicodin & Percocet.

## 2018-02-07 ENCOUNTER — HOSPITAL ENCOUNTER (OUTPATIENT)
Dept: MRI IMAGING | Age: 48
Discharge: HOME | End: 2018-02-07
Attending: ORTHOPAEDIC SURGERY
Payer: MEDICARE

## 2018-02-07 ENCOUNTER — HOSPITAL ENCOUNTER (OUTPATIENT)
Dept: GENERAL RADIOLOGY | Age: 48
Discharge: HOME | End: 2018-02-07
Attending: ORTHOPAEDIC SURGERY
Payer: MEDICARE

## 2018-02-07 DIAGNOSIS — M75.102 TEAR OF LEFT ROTATOR CUFF, UNSPECIFIED TEAR EXTENT: ICD-10-CM

## 2018-02-07 DIAGNOSIS — M75.52 BURSITIS OF LEFT SHOULDER: ICD-10-CM

## 2018-02-07 PROCEDURE — 74011000250 HC RX REV CODE- 250: Performed by: ORTHOPAEDIC SURGERY

## 2018-02-07 PROCEDURE — 74011636320 HC RX REV CODE- 636/320: Performed by: ORTHOPAEDIC SURGERY

## 2018-02-07 PROCEDURE — 73222 MRI JOINT UPR EXTREM W/DYE: CPT

## 2018-02-07 PROCEDURE — A9579 GAD-BASE MR CONTRAST NOS,1ML: HCPCS | Performed by: ORTHOPAEDIC SURGERY

## 2018-02-07 PROCEDURE — 77030014113 XR INJ SHOULDER LT PRE CT/MRI ARTHRO

## 2018-02-07 RX ORDER — SODIUM CHLORIDE 9 MG/ML
10 INJECTION INTRAMUSCULAR; INTRAVENOUS; SUBCUTANEOUS
Status: COMPLETED | OUTPATIENT
Start: 2018-02-07 | End: 2018-02-07

## 2018-02-07 RX ORDER — LIDOCAINE HYDROCHLORIDE 10 MG/ML
30 INJECTION, SOLUTION EPIDURAL; INFILTRATION; INTRACAUDAL; PERINEURAL
Status: COMPLETED | OUTPATIENT
Start: 2018-02-07 | End: 2018-02-07

## 2018-02-07 RX ADMIN — SODIUM CHLORIDE 10 ML: 9 INJECTION, SOLUTION INTRAMUSCULAR; INTRAVENOUS; SUBCUTANEOUS at 11:00

## 2018-02-07 RX ADMIN — GADOPENTETATE DIMEGLUMINE 0.15 ML: 469.01 INJECTION INTRAVENOUS at 11:00

## 2018-02-07 RX ADMIN — IOPAMIDOL 10 ML: 408 INJECTION, SOLUTION INTRATHECAL at 11:00

## 2018-02-07 RX ADMIN — LIDOCAINE HYDROCHLORIDE 10 ML: 10 INJECTION, SOLUTION EPIDURAL; INFILTRATION; INTRACAUDAL; PERINEURAL at 11:00

## 2018-02-07 NOTE — PROCEDURES
Interventional Radiology Brief Procedure Note    Patient: Yasmany Okeefe MRN: [id number]  SSN: [id number]    YOB: 1970  Age: 56 y.o. Sex: female      Date of Procedure: 2/7/2018    Procedure(s): Left Shoulder Arthrogram prior to MRI    Performed By: OPAL Bender     none    Anesthesia: Lidocaine     Estimated Blood Loss: None    Specimens: None    Findings:     - Written and verbal informed consent was obtained. - Time Out was performed. - Permanent image storage performed on PACS. - Image-guided left shoulder arthrogram performed using maximum barrier precautions and sterile technique. - Please refer to report on PACS for full details.      Implants: None    Plan: MRI of left shoulder to be performed following arthrogram     Follow Up: Per ordering provider    Signed By: OPAL Bender     February 7, 2018

## 2018-02-07 NOTE — H&P
OUTPATIENT HISTORY AND PHYSICAL      Today 2/7/2018     Indication/Symptoms:   Syl Thrasher is a 56 y.o. female with history left shoulder pain 2/2 bursitis and rotator cuff tear who presents for an image-guided left shoulder arthrogram prior to MRI. Current Meds:    Prior to Admission medications    Medication Sig Start Date End Date Taking? Authorizing Provider   oxyCODONE-acetaminophen (PERCOCET) 5-325 mg per tablet Take 1 Tab by mouth every six (6) hours as needed for Pain. Max Daily Amount: 4 Tabs. 10/20/17   Alexandrea Santiago MD   ondansetron (ZOFRAN ODT) 4 mg disintegrating tablet Take 1 Tab by mouth every eight (8) hours as needed for Nausea. 10/20/17   Alexandrea Santiago MD   fexofenadine (ALLEGRA) 180 mg tablet Take 180 mg by mouth daily. Emanuel Paniagua MD   multivitamin (ONE A DAY) tablet Take 1 Tab by mouth daily. Historical Provider   calcium-cholecalciferol, d3, (CALCIUM 600 + D) 600-125 mg-unit tab Take  by mouth. Historical Provider   ascorbic acid, vitamin C, (VITAMIN C) 250 mg tablet Take  by mouth. Historical Provider   cyanocobalamin (VITAMIN B-12) 1,000 mcg sublingual tablet Take 1,000 mcg by mouth daily. Historical Provider   cholecalciferol (VITAMIN D3) 1,000 unit tablet Take  by mouth daily. Historical Provider   B.infantis-B.ani-B.long-B.bifi (PROBIOTIC 4X) 10-15 mg TbEC Take  by mouth. Historical Provider       Allergies:       Allergies   Allergen Reactions    Erythromycin Anaphylaxis    Seldane-D Anaphylaxis     Coma     Sulfa (Sulfonamide Antibiotics) Anaphylaxis     Jolaine Jameson Syndrome       Comorbid Conditions:    Past Medical History:   Diagnosis Date    Fibromyalgia     Intestinal malabsorption     Raynaud phenomenon     S/P bariatric surgery     Sjogrens syndrome ([facility] [address]           Past Surgical History:   Procedure Laterality Date    BREAST SURGERY PROCEDURE UNLISTED      breast reduction    HX GYN      ABEBE single oophorectomy    HX HEENT tonsils    HX ORTHOPAEDIC      left knee atrthroscopy, Rt rotator cuff, left elbow    LAP GASTRIC BYPASS/REGULO-EN-Y      2/4/2009     Data:    There were no vitals taken for this visit.:  No results for input(s): PLT, PLTEXT in the last 72 hours. No lab exists for component:  HCT  No results for input(s): INR, APTT in the last 72 hours. No lab exists for component: PT, INREXT    The H & P and/or progress notes and any available imaging were reviewed. The risks, indications and possible alternatives to the procedure, including doing nothing, were discussed and informed consent was obtained. Physical Exam:      Mental status:   Alert and oriented. Examination specific to the procedure proposed to be performed and any co morbid conditions: The patient is an appropriate candidate to undergo the planned procedure.     Brandi Foley

## 2018-03-07 ENCOUNTER — HOSPITAL ENCOUNTER (EMERGENCY)
Age: 48
Discharge: HOME | End: 2018-03-07
Attending: EMERGENCY MEDICINE | Admitting: EMERGENCY MEDICINE
Payer: MEDICARE

## 2018-03-07 ENCOUNTER — APPOINTMENT (OUTPATIENT)
Dept: GENERAL RADIOLOGY | Age: 48
End: 2018-03-07
Attending: EMERGENCY MEDICINE
Payer: MEDICARE

## 2018-03-07 VITALS
WEIGHT: 185 LBS | SYSTOLIC BLOOD PRESSURE: 132 MMHG | HEIGHT: 66 IN | OXYGEN SATURATION: 99 % | RESPIRATION RATE: 22 BRPM | HEART RATE: 62 BPM | TEMPERATURE: 98.7 F | DIASTOLIC BLOOD PRESSURE: 64 MMHG | BODY MASS INDEX: 29.73 KG/M2

## 2018-03-07 DIAGNOSIS — G89.18 ACUTE POST-OPERATIVE PAIN: Primary | ICD-10-CM

## 2018-03-07 PROCEDURE — 71046 X-RAY EXAM CHEST 2 VIEWS: CPT

## 2018-03-07 PROCEDURE — 72040 X-RAY EXAM NECK SPINE 2-3 VW: CPT

## 2018-03-07 PROCEDURE — 99282 EMERGENCY DEPT VISIT SF MDM: CPT

## 2018-03-08 NOTE — ED TRIAGE NOTES
Patient states having shoulder surgery yesterday. States developing difficulty taking deep breath, neck pain, and chest pain.

## 2018-03-08 NOTE — ED NOTES
Lizeth Dawn is a 56 y.o. female that was discharged in stable. Pt was accompanied by spouse. Pt is not driving. The patients diagnosis, condition and treatment were explained to  patient and aftercare instructions were given. The patient verbalized understanding. Patient armband removed and shredded.

## 2018-03-08 NOTE — ED PROVIDER NOTES
EMERGENCY DEPARTMENT HISTORY AND PHYSICAL EXAM    7:16 PM      Date: 3/7/2018  Patient Name: Brittny Morley    History of Presenting Illness     Chief Complaint   Patient presents with    Post-Op Problem    Neck Pain    Breathing Problem         History Provided By: Patient    Chief Complaint: Post-Op Pain  Duration:  Days  Timing:  Acute  Location: Neck, Chest  Quality: Aching  Severity: Moderate  Modifying Factors: none  Associated Symptoms: denies any other associated signs or symptoms      Additional History (Context): Brittny Morley is a 56 y.o. female with history of Fibromyalgia, Raynaud's Syndrome and Sjogren's Syndrome who presents to the ED c/o post-op pain. Pt reports that she had shoulder surgery yesterday. Pt states that while she was on the operating table, she was having trouble breathing and informed the nurses/surgeon but they just \"knocked\" her out. Today, pt states that the nerve block has stopped working and she is having pain \"everywhere\" (Jaw, throat, chest). Pt notes that she had a similar surgery on 11/28/2017 and did not experience any of these symptoms post-op. Pt notes that she did not take any pain medication PTA, but took 5 mg percocet in ED. Pt denies any other acute sx at this time. PCP: Neal Ruff    Current Outpatient Prescriptions   Medication Sig Dispense Refill    oxyCODONE-acetaminophen (PERCOCET) 5-325 mg per tablet Take 1 Tab by mouth every six (6) hours as needed for Pain. Max Daily Amount: 4 Tabs. 14 Tab 0    ondansetron (ZOFRAN ODT) 4 mg disintegrating tablet Take 1 Tab by mouth every eight (8) hours as needed for Nausea. 14 Tab 0    fexofenadine (ALLEGRA) 180 mg tablet Take 180 mg by mouth daily.  multivitamin (ONE A DAY) tablet Take 1 Tab by mouth daily.  calcium-cholecalciferol, d3, (CALCIUM 600 + D) 600-125 mg-unit tab Take  by mouth.  ascorbic acid, vitamin C, (VITAMIN C) 250 mg tablet Take  by mouth.       cyanocobalamin (VITAMIN B-12) 1,000 mcg sublingual tablet Take 1,000 mcg by mouth daily.  cholecalciferol (VITAMIN D3) 1,000 unit tablet Take  by mouth daily.  B.infantis-B.ani-B.long-B.bifi (PROBIOTIC 4X) 10-15 mg TbEC Take  by mouth. Past History     Past Medical History:  Past Medical History:   Diagnosis Date    Fibromyalgia     Intestinal malabsorption     Raynaud phenomenon     S/P bariatric surgery     Sjogrens syndrome ([facility] [address]        Past Surgical History:  Past Surgical History:   Procedure Laterality Date    BREAST SURGERY PROCEDURE UNLISTED      breast reduction    HX GYN      ABEBE single oophorectomy    HX HEENT      tonsils    HX ORTHOPAEDIC      left knee atrthroscopy, Rt rotator cuff, left elbow    LAP GASTRIC BYPASS/REGULO-EN-Y      2/4/2009       Family History:  History reviewed. No pertinent family history. Social History:  Social History   Substance Use Topics    Smoking status: Never Smoker    Smokeless tobacco: Never Used    Alcohol use Yes      Comment: social       Allergies: Allergies   Allergen Reactions    Erythromycin Anaphylaxis    Seldane-D Anaphylaxis     Coma     Sulfa (Sulfonamide Antibiotics) Anaphylaxis     Duanne Lars Syndrome         Review of Systems     Review of Systems   Constitutional: Negative. HENT: Positive for sore throat. Eyes: Negative. Cardiovascular: Positive for chest pain. Gastrointestinal: Negative. Endocrine: Negative. Genitourinary: Negative. Musculoskeletal: Positive for myalgias and neck pain. Skin: Negative. Allergic/Immunologic: Negative. Neurological: Negative. Hematological: Negative. Psychiatric/Behavioral: Negative. All other systems reviewed and are negative.         Physical Exam     Visit Vitals    /64 (BP 1 Location: Right arm, BP Patient Position: At rest)    Pulse 62    Temp 98.7 °F (37.1 °C)    Resp 22    Ht 5' 6\" (1.676 m)    Wt 83.9 kg (185 lb)    SpO2 99%    BMI 29.86 kg/m2     Physical Exam   Constitutional: She is oriented to person, place, and time. She appears well-developed and well-nourished. No distress. HENT:   Head: Normocephalic. Right Ear: External ear normal.   Left Ear: External ear normal.   Mouth/Throat: No oropharyngeal exudate. Eyes: Conjunctivae and EOM are normal. Pupils are equal, round, and reactive to light. Right eye exhibits no discharge. Left eye exhibits no discharge. No scleral icterus. Neck: Normal range of motion. Neck supple. No JVD present. No tracheal deviation present. No thyromegaly present. Cardiovascular: Normal rate, regular rhythm, normal heart sounds and intact distal pulses. Exam reveals no gallop and no friction rub. No murmur heard. Pulmonary/Chest: Effort normal and breath sounds normal. No stridor. No respiratory distress. She has no wheezes. She has no rales. She exhibits no tenderness. Abdominal: Soft. Bowel sounds are normal. She exhibits no distension and no mass. There is no tenderness. There is no rebound and no guarding. Musculoskeletal: Normal range of motion. She exhibits no edema or tenderness. Lymphadenopathy:     She has no cervical adenopathy. Neurological: She is alert and oriented to person, place, and time. She displays normal reflexes. No cranial nerve deficit. She exhibits normal muscle tone. Coordination normal.   Skin: Skin is warm and dry. No rash noted. She is not diaphoretic. No erythema. No pallor. Nursing note and vitals reviewed. Diagnostic Study Results     Labs -  No results found for this or any previous visit (from the past 12 hour(s)). Radiologic Studies -   XR SPINE CERV PA LAT ODONT 3 V MAX   Final Result   IMPRESSION: No acute finding suspected. XR CHEST PA LAT    (Results Pending)   9:11 PM Preliminary Reading by Dr. Marla Vieyra - Negative. Medical Decision Making   I am the first provider for this patient.     I reviewed the vital signs, available nursing notes, past medical history, past surgical history, family history and social history. Vital Signs-Reviewed the patient's vital signs. Pulse Oximetry Analysis -  99% on room air (Normal)    Cardiac Monitor:  Rate: 62  Rhythm:  Normal Sinus Rhythm     Records Reviewed: Nursing Notes and Old Medical Records (Time of Review: 7:16 PM)    ED Course: Progress Notes, Reevaluation, and Consults:  9:10 PM Pt took 5 mg Percocet. Sx resolved. XRAY nml. Explained to pt to f/u with doctor is sx return or worsen. Ready for d/c at this time. Provider Notes (Medical Decision Making):   DDx: Throat pain s/t intubation vs Contusion of throat vs Fracture of larynx vs Angioedema    Diagnosis     Clinical Impression:   1. Acute post-operative pain        Disposition: DISCHARGE    Follow-up Information     None           Patient's Medications   Start Taking    No medications on file   Continue Taking    ASCORBIC ACID, VITAMIN C, (VITAMIN C) 250 MG TABLET    Take  by mouth. B.INFANTIS-B. ANI-B. LONG-B.BIFI (PROBIOTIC 4X) 10-15 MG TBEC    Take  by mouth. CALCIUM-CHOLECALCIFEROL, D3, (CALCIUM 600 + D) 600-125 MG-UNIT TAB    Take  by mouth. CHOLECALCIFEROL (VITAMIN D3) 1,000 UNIT TABLET    Take  by mouth daily. CYANOCOBALAMIN (VITAMIN B-12) 1,000 MCG SUBLINGUAL TABLET    Take 1,000 mcg by mouth daily. FEXOFENADINE (ALLEGRA) 180 MG TABLET    Take 180 mg by mouth daily. MULTIVITAMIN (ONE A DAY) TABLET    Take 1 Tab by mouth daily. ONDANSETRON (ZOFRAN ODT) 4 MG DISINTEGRATING TABLET    Take 1 Tab by mouth every eight (8) hours as needed for Nausea. OXYCODONE-ACETAMINOPHEN (PERCOCET) 5-325 MG PER TABLET    Take 1 Tab by mouth every six (6) hours as needed for Pain. Max Daily Amount: 4 Tabs.    These Medications have changed    No medications on file   Stop Taking    No medications on file     _______________________________    Attestations:  [address] acting as a scribe for and in the presence of Paris Nielsen, MD      March 07, 2018 at 7:28 PM       Provider Attestation:      I personally performed the services described in the documentation, reviewed the documentation, as recorded by the scribe in my presence, and it accurately and completely records my words and actions.  March 07, 2018 at 7:28 PM - Rober Hercules MD    _______________________________

## 2018-03-08 NOTE — DISCHARGE INSTRUCTIONS
Acute Pain After Surgery: Care Instructions  Your Care Instructions    It's common to have some pain after surgery. Pain doesn't mean that something is wrong or that the surgery didn't go well. But when the pain is severe, it's important to work with your doctor to manage it. It's also important to be aware of a few facts about pain and pain medicine. · You are the only person who knows what your pain feels like. So be sure to tell your doctor when you are in pain or when the pain changes. Then he or she will know how to adjust your medicines. · Pain is often easier to control right after it starts. So it may be better to take regular doses of pain medicine and not wait until the pain gets bad. · Medicine can help control pain. But this doesn't mean you'll have no pain. Medicine works to keep the pain at a level you can live with. With time, you will feel better. Follow-up care is a key part of your treatment and safety. Be sure to make and go to all appointments, and call your doctor if you are having problems. It's also a good idea to know your test results and keep a list of the medicines you take. How can you care for yourself at home? · Be safe with medicines. Read and follow all instructions on the label. ¨ If the doctor gave you a prescription medicine for pain, take it as prescribed. ¨ If you are not taking a prescription pain medicine, ask your doctor if you can take an over-the-counter medicine. · If you take an over-the-counter pain medicine, such as acetaminophen (Tylenol), ibuprofen (Advil, Motrin), or naproxen (Aleve), read and follow all instructions on the label. · Do not take two or more pain medicines at the same time unless the doctor told you to. · Do not drink alcohol while you are taking pain medicines. · Try to walk each day if your doctor recommends it. Start by walking a little more than you did the day before. Bit by bit, increase the amount you walk.  Walking increases blood flow. It also helps prevent pneumonia and constipation. · To prevent constipation from opioid pain medicines:  ¨ Talk to your doctor about a laxative. ¨ Include fruits, vegetables, beans, and whole grains in your diet each day. These foods are high in fiber. ¨ Drink plenty of fluids, enough so that your urine is light yellow or clear like water. Drink water, fruit juice, or other drinks that do not contain caffeine or alcohol. If you have kidney, heart, or liver disease and have to limit fluids, talk with your doctor before you increase the amount of fluids you drink. ¨ Take a fiber supplement, such as Citrucel or Metamucil, every day if needed. Read and follow all instructions on the label. If you take pain medicine for more than a few days, talk to your doctor before you take fiber. When should you call for help? Call your doctor now or seek immediate medical care if:  ? · Your pain gets worse. ? · Your pain is not controlled by medicine. ? Watch closely for changes in your health, and be sure to contact your doctor if you have any problems. Where can you learn more? Go to http://silvina-gifty.info/. Enter [phone] [phone] in the search box to learn more about \"Acute Pain After Surgery: Care Instructions. \"  Current as of: March 20, 2017  Content Version: 11.4  © 5943-8113 [employer]. Care instructions adapted under license by [employer] (which disclaims liability or warranty for this information). If you have questions about a medical condition or this instruction, always ask your healthcare professional. [place] [address] any warranty or liability for your use of this information.

## 2021-02-09 NOTE — PROGRESS NOTES
Breast Pain Consult      Ms. Maria Elena Rhodes is a 53year old woman who was referred for right breast pain. It is located laterally. She initially noticed the pain Labor Day 2020. It waxes and wanes. She denies history of similar symptoms previously. She  denies nipple discharge. Imaging has been performed and was negative. She has a history of bilateral reduction and fibromyalgia. She reports some improvement with Lyrica. A maternal third cousin and great aunt had breast cancer. She is considering repeat reduction. Breast/GYN history:    OB History    No obstetric history on file. Past Medical History:   Diagnosis Date    Fibromyalgia     Intestinal malabsorption     Raynaud phenomenon     S/P bariatric surgery     Sjogrens syndrome ([facility] [address]        Past Surgical History:   Procedure Laterality Date    HX GYN      ABEBE single oophorectomy    HX HEENT      tonsils    HX ORTHOPAEDIC      left knee atrthroscopy, Rt rotator cuff, left elbow    [place] BREAST SURGERY PROCEDURE UNLISTED      breast reduction    [place] LAP GASTRIC BYPASS/REGULO-EN-Y      2/4/2009       Current Outpatient Medications on File Prior to Visit   Medication Sig Dispense Refill    methylphenidate ER 36 mg 24 hr tab Take 36 mg by mouth every morning.  pregabalin (Lyrica) 25 mg capsule Take 25 mg by mouth two (2) times a day. 25 mg in the am and 75 mg at night      Cetirizine (ZyrTEC) 10 mg cap Take  by mouth.  multivitamin (ONE A DAY) tablet Take 1 Tab by mouth daily.  calcium-cholecalciferol, d3, (CALCIUM 600 + D) 600-125 mg-unit tab Take  by mouth.  ascorbic acid, vitamin C, (VITAMIN C) 250 mg tablet Take  by mouth.  cholecalciferol (VITAMIN D3) 1,000 unit tablet Take  by mouth daily.  oxyCODONE-acetaminophen (PERCOCET) 5-325 mg per tablet Take 1 Tab by mouth every six (6) hours as needed for Pain. Max Daily Amount: 4 Tabs.  14 Tab 0    ondansetron (ZOFRAN ODT) 4 mg disintegrating tablet Take 1 Tab by mouth every eight (8) hours as needed for Nausea. 14 Tab 0    fexofenadine (ALLEGRA) 180 mg tablet Take 180 mg by mouth daily.  cyanocobalamin (VITAMIN B-12) 1,000 mcg sublingual tablet Take 1,000 mcg by mouth daily.  B.infantis-B.ani-B.long-B.bifi (PROBIOTIC 4X) 10-15 mg TbEC Take  by mouth. No current facility-administered medications on file prior to visit. Allergies   Allergen Reactions    Erythromycin Anaphylaxis    Seldane-D Anaphylaxis     Coma     Sulfa (Sulfonamide Antibiotics) Anaphylaxis     Burnie Servant Syndrome       Social History     Tobacco Use    Smoking status: Never Smoker    Smokeless tobacco: Never Used   Substance Use Topics    Alcohol use: Yes     Comment: social    Drug use: No       No family history on file.       ROS:   General: denies fevers, chills, night sweats, fatigue, weight loss, weight gain  GI: denies nausea, vomiting, abdominal pain, change in bowel habits, hematochezia, melena, GERD  Integ: denies dermatitis, abnormal moles  HEENT: denies visual changes, vertigo, epistaxis, dysphagia, hoarseness  Cardiac: denies chest pain, palpitations, HTN, edema, varicosities  Resp: denies cough, shortness of breath, wheezing, hemoptysis, snoring, reactive airway disease  : denies hematuria, dysuria, frequency, urgency, nocturia, stress urinary incontinence   MSK: denies weakness, joint pain, arthritis  Endocrine: denies diabetes, thyroid disease, polyuria, polydipsia, polyphagia, poor wound healing, heat intolerance, cold intolerance  Lymph/Heme: denies anemia, bruising, history of blood transfusions  Neuro: denies dizziness, headache, fainting, seizures, stroke  Psych: denies anxiety, depression    Physical Exam:  Visit Vitals  /76 (BP 1 Location: Left upper arm, BP Patient Position: Sitting)   Resp 20   Ht 5' 6\" (1.676 m)   Wt 91.6 kg (202 lb)   BMI 32.60 kg/m²       Gen: Well developed, well nourished woman in no acute distress  Head: normocephalic, atraumatic  Mouth: Clear, no overt lesions, oral mucosa pink and moist  Neck: supple, no masses, no adenopathy, trachea midline  Resp: clear bilaterally  Cardio: Regular rate and rhythm  Abdomen: soft, nontender, nondistended  Extremeties: warm, well-perfused  Neuro: sensation and strength grossly intact and symmetrical  Psych: alert and oriented to person, place and time  Breasts:   Right: Examined in both the supine and upright positions. There was no supraclavicular, infraclavicular, or axillary lympadenopathy. There were no dominant masses, no skin changes, no asymmetry identified healed surgical scars  Left: Examined in both the supine and upright positions. There was no supraclavicular, infraclavicular, or axillary lympadenopathy. There were no dominant masses, no skin changes, no asymmetry identified healed surgical scars       Imagin/10/20  Bilateral mammogram/right ultrasound ([facility])  No mammographic or sonographic evidence of malignancy. Recommend clinical follow-up, and in the absence of new or worsening clinical symptomatology, annual screening mammography. The patient was provided an information form on breast pain from the [facility]. Findings and recommendations were discussed with the patient at the conclusion of the examination and a result letter will be sent to the patient. The patient will be notified by the [facility] reminder system for scheduling of her annual screening mammogram.    Assessment Category 2: Benign    Signed By: Gildardo Madison MD on 9/10/2020 3:21 PM    Impression:  Patient Active Problem List   Diagnosis Code    Intestinal malabsorption K90.9    Fibromyalgia M79.7    Raynaud phenomenon I73.00    Sjogrens syndrome ([facility] [address] M35.00    S/P bariatric surgery Z98.84    Breast pain N64.4          53year old woman with right breast pain. She was given information on and strategies for relief of breast pain.  I have also recommended she check with her pharmacist prior to starting any new medications. She cannot take ibuprofen due to history of bypass We discussed wearing well fitting bras. I have recommended she be fitted to determine appropriate size. We additionally discussed the role of caffeine and stress in breast pain. We did specifically address the fact that conservative management, while successful in most cases does not produce results overnight. Repeat reduction may help her pain, though we cannot guarantee it. She is due for bilateral mammogram September 2021. She was asked to call with questions or concerns.

## 2021-02-10 ENCOUNTER — OFFICE VISIT (OUTPATIENT)
Dept: SURGERY | Age: 51
End: 2021-02-10
Payer: COMMERCIAL

## 2021-02-10 VITALS
HEIGHT: 66 IN | BODY MASS INDEX: 32.47 KG/M2 | RESPIRATION RATE: 20 BRPM | WEIGHT: 202 LBS | DIASTOLIC BLOOD PRESSURE: 76 MMHG | SYSTOLIC BLOOD PRESSURE: 122 MMHG

## 2021-02-10 DIAGNOSIS — N64.4 BREAST PAIN: Primary | ICD-10-CM

## 2021-02-10 PROCEDURE — G8427 DOCREV CUR MEDS BY ELIG CLIN: HCPCS | Performed by: SURGERY

## 2021-02-10 PROCEDURE — G8432 DEP SCR NOT DOC, RNG: HCPCS | Performed by: SURGERY

## 2021-02-10 PROCEDURE — 99204 OFFICE O/P NEW MOD 45 MIN: CPT | Performed by: SURGERY

## 2021-02-10 PROCEDURE — G8419 CALC BMI OUT NRM PARAM NOF/U: HCPCS | Performed by: SURGERY

## 2021-02-10 RX ORDER — METHYLPHENIDATE HYDROCHLORIDE 36 MG/1
56 TABLET ORAL
COMMUNITY

## 2021-02-10 RX ORDER — CETIRIZINE HYDROCHLORIDE 10 MG/1
CAPSULE, LIQUID FILLED ORAL
COMMUNITY

## 2021-02-10 RX ORDER — PREGABALIN 25 MG/1
25 CAPSULE ORAL 2 TIMES DAILY
COMMUNITY
End: 2022-10-18

## 2021-02-10 NOTE — LETTER
2/10/2021 11:56 AM 
 
Patient:  Chelle Dias YOB: 1970 Date of Visit: 2/10/2021 SUZIE RodriguezSolange Verma [address] [facility] [address] [address] Fax: [phone] Dear Ivonne Marino NP, 
 
 
I had the pleasure of seeing Ms. Chelel Dias in my office today for her breast pain. I am including a copy of my office visit today. If you have questions, please do not hesitate to call me. I look forward to following Ms. Lopez along with you and will keep you updated as to her progress. Sincerely, Maria Ines Quiñonez MD

## 2021-07-19 ENCOUNTER — OFFICE VISIT (OUTPATIENT)
Dept: SURGERY | Age: 51
End: 2021-07-19
Payer: MEDICARE

## 2021-07-19 VITALS
HEIGHT: 66 IN | DIASTOLIC BLOOD PRESSURE: 88 MMHG | TEMPERATURE: 98.1 F | SYSTOLIC BLOOD PRESSURE: 140 MMHG | RESPIRATION RATE: 20 BRPM | WEIGHT: 190 LBS | BODY MASS INDEX: 30.53 KG/M2

## 2021-07-19 DIAGNOSIS — N64.4 BREAST PAIN: Primary | ICD-10-CM

## 2021-07-19 PROCEDURE — 3017F COLORECTAL CA SCREEN DOC REV: CPT | Performed by: SURGERY

## 2021-07-19 PROCEDURE — G8432 DEP SCR NOT DOC, RNG: HCPCS | Performed by: SURGERY

## 2021-07-19 PROCEDURE — G8427 DOCREV CUR MEDS BY ELIG CLIN: HCPCS | Performed by: SURGERY

## 2021-07-19 PROCEDURE — G8417 CALC BMI ABV UP PARAM F/U: HCPCS | Performed by: SURGERY

## 2021-07-19 PROCEDURE — 99213 OFFICE O/P EST LOW 20 MIN: CPT | Performed by: SURGERY

## 2021-07-19 RX ORDER — METFORMIN HYDROCHLORIDE 500 MG/1
TABLET ORAL 2 TIMES DAILY WITH MEALS
COMMUNITY

## 2021-07-19 RX ORDER — LANOLIN ALCOHOL/MO/W.PET/CERES
CREAM (GRAM) TOPICAL
COMMUNITY
End: 2022-10-18

## 2021-07-19 NOTE — PROGRESS NOTES
Breast Pain       Ms. Monica Lopez is a 49year old woman who was referred for right breast pain. It is located laterally. She initially noticed the pain Labor Day 2020. It waxes and wanes. She denies history of similar symptoms previously. She  denies nipple discharge. Imaging has been performed and was negative. She has a history of bilateral reduction and fibromyalgia. She reports some improvement with Lyrica. A maternal third cousin and great aunt had breast cancer. She is considering repeat reduction. She returns 7/19/21 noting dramatic increase in pain since June 2021. She reports the pain is deep and radiates to her shoulder. She has met with Dr. Ezequiel Lopez and is considering repeat reduction. She states he said she may have a \"cyst on her thoracic nerve\" contributing. He plans operation in the fall once her work schedule accomodates. She has had gastric bypass and cannot take ibuprofen. Breast/GYN history:    OB History    No obstetric history on file. Past Medical History:   Diagnosis Date    Fibromyalgia     Intestinal malabsorption     Raynaud phenomenon     S/P bariatric surgery     Sjogrens syndrome ([facility] [address]        Past Surgical History:   Procedure Laterality Date    HX GYN      ABEBE single oophorectomy    HX HEENT      tonsils    HX ORTHOPAEDIC      left knee atrthroscopy, Rt rotator cuff, left elbow    GA BREAST SURGERY PROCEDURE UNLISTED      breast reduction    GA LAP GASTRIC BYPASS/REGULO-EN-Y      2/4/2009       Current Outpatient Medications on File Prior to Visit   Medication Sig Dispense Refill    ferrous sulfate (Iron) 325 mg (65 mg iron) tablet Take  by mouth every Monday, Wednesday, Friday. MWF      metFORMIN (GLUCOPHAGE) 500 mg tablet Take  by mouth two (2) times daily (with meals).  methylphenidate ER 36 mg 24 hr tab Take 36 mg by mouth every morning.  Cetirizine (ZyrTEC) 10 mg cap Take  by mouth.       fexofenadine (ALLEGRA) 180 mg tablet Take 180 mg by mouth daily.  multivitamin (ONE A DAY) tablet Take 1 Tab by mouth daily.  calcium-cholecalciferol, d3, (CALCIUM 600 + D) 600-125 mg-unit tab Take  by mouth.  ascorbic acid, vitamin C, (VITAMIN C) 250 mg tablet Take  by mouth.  cyanocobalamin (VITAMIN B-12) 1,000 mcg sublingual tablet Take 1,000 mcg by mouth daily.  cholecalciferol (VITAMIN D3) 1,000 unit tablet Take  by mouth daily.  B.infantis-B.ani-B.long-B.bifi (PROBIOTIC 4X) 10-15 mg TbEC Take  by mouth.  pregabalin (Lyrica) 25 mg capsule Take 25 mg by mouth two (2) times a day. 25 mg in the am and 75 mg at night (Patient not taking: Reported on 7/19/2021)      oxyCODONE-acetaminophen (PERCOCET) 5-325 mg per tablet Take 1 Tab by mouth every six (6) hours as needed for Pain. Max Daily Amount: 4 Tabs. (Patient not taking: Reported on 7/19/2021) 14 Tab 0    ondansetron (ZOFRAN ODT) 4 mg disintegrating tablet Take 1 Tab by mouth every eight (8) hours as needed for Nausea. (Patient not taking: Reported on 7/19/2021) 14 Tab 0     No current facility-administered medications on file prior to visit. Allergies   Allergen Reactions    Erythromycin Anaphylaxis    Seldane-D Anaphylaxis     Coma     Sulfa (Sulfonamide Antibiotics) Anaphylaxis     Gary Pretzel Syndrome       Social History     Tobacco Use    Smoking status: Never Smoker    Smokeless tobacco: Never Used   Substance Use Topics    Alcohol use: Yes     Comment: social    Drug use: No       No family history on file.       ROS:   General: denies fevers, chills, night sweats, fatigue, weight loss, weight gain  GI: denies nausea, vomiting, abdominal pain, change in bowel habits, hematochezia, melena, GERD  Integ: denies dermatitis, abnormal moles  HEENT: denies visual changes, vertigo, epistaxis, dysphagia, hoarseness  Cardiac: denies chest pain, palpitations, HTN, edema, varicosities  Resp: denies cough, shortness of breath, wheezing, hemoptysis, snoring, reactive airway disease  : denies hematuria, dysuria, frequency, urgency, nocturia, stress urinary incontinence   MSK: denies weakness, joint pain, arthritis  Endocrine: denies diabetes, thyroid disease, polyuria, polydipsia, polyphagia, poor wound healing, heat intolerance, cold intolerance  Lymph/Heme: denies anemia, bruising, history of blood transfusions  Neuro: denies dizziness, headache, fainting, seizures, stroke  Psych: denies anxiety, depression    Physical Exam:  Visit Vitals  BP (!) 140/88   Temp 98.1 °F (36.7 °C) (Skin)   Resp 20   Ht 5' 6\" (1.676 m)   Wt 86.2 kg (190 lb)   BMI 30.67 kg/m²       Gen: Well developed, well nourished woman in no acute distress  Head: normocephalic, atraumatic  Mouth: Clear, no overt lesions, oral mucosa pink and moist  Neck: supple, no masses, no adenopathy, trachea midline  Resp: clear bilaterally  Cardio: Regular rate and rhythm  Abdomen: soft, nontender, nondistended  Extremeties: warm, well-perfused  Neuro: sensation and strength grossly intact and symmetrical  Psych: alert and oriented to person, place and time  Breasts:   Right: Examined in both the supine and upright positions. There was no supraclavicular, infraclavicular, or axillary lympadenopathy. There were no dominant masses, no skin changes, no asymmetry identified healed surgical scars  Left: Examined in both the supine and upright positions. There was no supraclavicular, infraclavicular, or axillary lympadenopathy. There were no dominant masses, no skin changes, no asymmetry identified healed surgical scars       Imagin/10/20  Bilateral mammogram/right ultrasound ([facility])  No mammographic or sonographic evidence of malignancy. Recommend clinical follow-up, and in the absence of new or worsening clinical symptomatology, annual screening mammography. The patient was provided an information form on breast pain from the [facility].     Findings and recommendations were discussed with the patient at the conclusion of the examination and a result letter will be sent to the patient. The patient will be notified by the [facility] reminder system for scheduling of her annual screening mammogram.    Assessment Category 2: Benign    Signed By: Maria Alejandra Riddle MD on 9/10/2020 3:21 PM    Impression:  Patient Active Problem List   Diagnosis Code    Intestinal malabsorption K90.9    Fibromyalgia M79.7    Raynaud phenomenon I73.00    Sjogrens syndrome ([facility] [address] M35.00    S/P bariatric surgery Z98.84    Breast pain N64.4         49year old woman with right breast pain. She was given information on and strategies for relief of breast pain. We did specifically address the fact that conservative management, while successful in most cases does not produce results overnight. Repeat reduction may help her pain, though we cannot guarantee it. Given the increase in pain and upcoming plans for surgery, we dicussed repeating imaging now. I have recommended imaging to evaluate further. We discussed possible need for image guided biopsy versus observation pending these results. Additional recommendations pending imaging findings. She was asked to call with questions or concerns.

## 2021-07-19 NOTE — PROGRESS NOTES
Patient presents for worsening right breast pain. 1. Have you been to the ER, urgent care clinic since your last visit? Hospitalized since your last visit? No    2. Have you seen or consulted any other health care providers outside of the [address] [place] [address] since your last visit? Include any pap smears or colon screening.  Yes Plastics

## 2021-07-19 NOTE — LETTER
7/19/2021 9:23 AM    Patient:  Aayush Ferrari   YOB: 1970  Date of Visit: 7/19/2021      Irma Momin, [address] [address]  Via Fax: [phone]    Dear Irma Momin, NP,      I had the pleasure of seeing Ms. Aayush Ferrari in my office today for her breast pain. I am including a copy of my office visit today. If you have questions, please do not hesitate to call me. I look forward to following Ms. Lopez along with you and will keep you updated as to her progress.            Sincerely,      Anabel Groves MD

## 2022-03-19 PROBLEM — N64.4 BREAST PAIN: Status: ACTIVE | Noted: 2021-02-10

## 2022-04-22 ENCOUNTER — OFFICE VISIT (OUTPATIENT)
Dept: CARDIOLOGY CLINIC | Age: 52
End: 2022-04-22
Payer: MEDICARE

## 2022-04-22 VITALS
HEART RATE: 73 BPM | BODY MASS INDEX: 23.3 KG/M2 | HEIGHT: 66 IN | OXYGEN SATURATION: 97 % | SYSTOLIC BLOOD PRESSURE: 122 MMHG | WEIGHT: 145 LBS | DIASTOLIC BLOOD PRESSURE: 74 MMHG

## 2022-04-22 DIAGNOSIS — R00.2 PALPITATIONS: ICD-10-CM

## 2022-04-22 DIAGNOSIS — R07.9 CHEST PAIN, UNSPECIFIED TYPE: Primary | ICD-10-CM

## 2022-04-22 DIAGNOSIS — R06.02 SOB (SHORTNESS OF BREATH): ICD-10-CM

## 2022-04-22 DIAGNOSIS — M79.89 LEG SWELLING: ICD-10-CM

## 2022-04-22 PROCEDURE — 93000 ELECTROCARDIOGRAM COMPLETE: CPT | Performed by: INTERNAL MEDICINE

## 2022-04-22 PROCEDURE — 99204 OFFICE O/P NEW MOD 45 MIN: CPT | Performed by: INTERNAL MEDICINE

## 2022-04-22 PROCEDURE — G8420 CALC BMI NORM PARAMETERS: HCPCS | Performed by: INTERNAL MEDICINE

## 2022-04-22 PROCEDURE — 3017F COLORECTAL CA SCREEN DOC REV: CPT | Performed by: INTERNAL MEDICINE

## 2022-04-22 PROCEDURE — G8427 DOCREV CUR MEDS BY ELIG CLIN: HCPCS | Performed by: INTERNAL MEDICINE

## 2022-04-22 PROCEDURE — G8510 SCR DEP NEG, NO PLAN REQD: HCPCS | Performed by: INTERNAL MEDICINE

## 2022-04-22 NOTE — PROGRESS NOTES
Soniawarren Vonda presents today for   Chief Complaint   Patient presents with    New Patient     self referred        Aayush Ferrari preferred language for health care discussion is english/other. Is someone accompanying this pt? yes    Is the patient using any DME equipment during [address]? no    Depression Screening:  3 most recent PHQ Screens 4/22/2022   Little interest or pleasure in doing things Not at all   Feeling down, depressed, irritable, or hopeless Not at all   Total Score PHQ 2 0       Learning Assessment:  Learning Assessment 4/22/2022   PRIMARY LEARNER Patient   PRIMARY LANGUAGE ENGLISH   LEARNER PREFERENCE PRIMARY DEMONSTRATION   ANSWERED BY patient   RELATIONSHIP SELF       Abuse Screening:  Abuse Screening Questionnaire 4/22/2022   Do you ever feel afraid of your partner? N   Are you in a relationship with someone who physically or mentally threatens you? N   Is it safe for you to go home? Y       Fall Risk  No flowsheet data found. Pt currently taking Anticoagulant therapy? no    Pt currently taking Antiplatelet therapy ? no      Coordination of Care:  1. Have you been to the ER, urgent care clinic since your last visit? Hospitalized since your last visit? no    2. Have you seen or consulted any other health care providers outside of the [address] [place] [address] since your last visit? Include any pap smears or colon screening.  no

## 2022-04-22 NOTE — PROGRESS NOTES
HISTORY OF PRESENT ILLNESS  Tram Mena is a 47 y.o. female. ASSESSMENT and PLAN    Ms. Tram Mena has no prior documented history of coronary disease. However, she has strong family history of CAD. She denies any knowledge of diabetes mellitus, hypertension or hyperlipidemia. She denies tobacco use. She does have family history of coronary artery disease in her father. She is  to [employer] and runs well [employer] company with her . She has history of obesity status post gastric bypass surgery 2009. Her initial weight was 280 pounds. She had lost weight 268 pounds. Around 1992, she had breast reduction surgery performed. In November 2021, she had diagnosis of benign chest tumor, lipoma. It was about golf ball size. She had it removed. Since that time, she has had slow recovery since liposuction was also involved. From cardiac standpoint, she has not been overly active physically. She has been recovering from her lipoma surgery. However, she has noted worsening chest pressure and throat pressure. This is resolved with rest.  She does take pain medications for this. Her blood pressure is well controlled at 122/74. Her rhythm remains stable sinus at 75 bpm.  There is no evidence of decompensated CHF noted. Her weight today is 145 pounds. Her target LDL for now is less than 90. She denies tobacco use. With her chest pains, and her family history, I would like to proceed with exercise nuclear scan as well as echocardiogram.   If these testing are unremarkable, I will see her back in 6 months. Encounter Diagnoses   Name Primary?     Chest pain, unspecified type Yes    SOB (shortness of breath)     Leg swelling     Palpitations      current treatment plan is effective, no change in therapy  lab results and schedule of future lab studies reviewed with patient  reviewed diet, exercise and weight control  cardiovascular risk and specific lipid/LDL goals reviewed      HPI   Today, Ms. Lopez has complaints of chest pressure and tightness. She also has it radiating to her throat. These are not specifically related to physical exertion. However, pain gets better when she does rest lay flat on bed. She has had this gradually worsening since November 2021 when she had lipoma resection in her chest.  Her father recently had coronary intervention. She is quite worried and concerned. She denies any orthopnea or PND. She denies any palpitations or dizziness. Review of Systems   Respiratory: Negative for shortness of breath. Cardiovascular: Positive for chest pain. Negative for palpitations, orthopnea, claudication, leg swelling and PND. All other systems reviewed and are negative. Physical Exam  Vitals and nursing note reviewed. Constitutional:       Appearance: Normal appearance. HENT:      Head: Normocephalic. Eyes:      Conjunctiva/sclera: Conjunctivae normal.   Neck:      Vascular: No carotid bruit. Cardiovascular:      Rate and Rhythm: Normal rate and regular rhythm. Pulmonary:      Breath sounds: Normal breath sounds. Abdominal:      Palpations: Abdomen is soft. Musculoskeletal:         General: No swelling. Cervical back: No rigidity. Skin:     General: Skin is warm and dry. Neurological:      General: No focal deficit present. Mental Status: She is alert and oriented to person, place, and time.    Psychiatric:         Mood and Affect: Mood normal.         Behavior: Behavior normal.         PCP: Chely Wilson NP    Past Medical History:   Diagnosis Date    Fibromyalgia     Intestinal malabsorption     Raynaud phenomenon     S/P bariatric surgery     Sjogrens syndrome ([facility] [address]        Past Surgical History:   Procedure Laterality Date    HX GYN      ABEBE single oophorectomy    HX HEENT      tonsils    HX ORTHOPAEDIC      left knee atrthroscopy, Rt rotator cuff, left elbow    AR BREAST SURGERY PROCEDURE UNLISTED breast reduction    AL LAP GASTRIC BYPASS/REGULO-EN-Y      2/4/2009       Current Outpatient Medications   Medication Sig Dispense Refill    ferrous sulfate (Iron) 325 mg (65 mg iron) tablet Take  by mouth every Monday, Wednesday, Friday. MWF      metFORMIN (GLUCOPHAGE) 500 mg tablet Take  by mouth two (2) times daily (with meals).  methylphenidate ER 36 mg 24 hr tab Take 54 mg by mouth every morning.  pregabalin (Lyrica) 25 mg capsule Take 25 mg by mouth two (2) times a day. 25 mg in the am and 75 mg at night       Cetirizine (ZyrTEC) 10 mg cap Take  by mouth.  multivitamin (ONE A DAY) tablet Take 1 Tab by mouth daily.  calcium-cholecalciferol, d3, (CALCIUM 600 + D) 600-125 mg-unit tab Take  by mouth.  ascorbic acid, vitamin C, (VITAMIN C) 250 mg tablet Take  by mouth.  cyanocobalamin (VITAMIN B-12) 1,000 mcg sublingual tablet Take 1,000 mcg by mouth daily.  cholecalciferol (VITAMIN D3) 1,000 unit tablet Take  by mouth daily.  B.infantis-B.ani-B.long-B.bifi (PROBIOTIC 4X) 10-15 mg TbEC Take  by mouth.  oxyCODONE-acetaminophen (PERCOCET) 5-325 mg per tablet Take 1 Tab by mouth every six (6) hours as needed for Pain. Max Daily Amount: 4 Tabs. (Patient not taking: Reported on 4/22/2022) 14 Tab 0    ondansetron (ZOFRAN ODT) 4 mg disintegrating tablet Take 1 Tab by mouth every eight (8) hours as needed for Nausea. (Patient not taking: Reported on 7/19/2021) 14 Tab 0    fexofenadine (ALLEGRA) 180 mg tablet Take 180 mg by mouth daily.  (Patient not taking: Reported on 4/22/2022)         The patient has a family history of    Social History     Tobacco Use    Smoking status: Never Smoker    Smokeless tobacco: Never Used   Vaping Use    Vaping Use: Never used   Substance Use Topics    Alcohol use: Yes     Comment: social    Drug use: No       Lab Results   Component Value Date/Time    Cholesterol, total 160 05/11/2010 09:45 AM    HDL Cholesterol 60 05/11/2010 09:45 AM LDL, calculated 75.2 05/11/2010 09:45 AM    Triglyceride 124 05/11/2010 09:45 AM    CHOL/HDL Ratio 2.7 05/11/2010 09:45 AM        BP Readings from Last 3 Encounters:   04/22/22 122/74   07/19/21 (!) 140/88   02/10/21 122/76        Pulse Readings from Last 3 Encounters:   04/22/22 73   03/07/18 62   10/19/17 65       Wt Readings from Last 3 Encounters:   04/22/22 65.8 kg (145 lb)   07/19/21 86.2 kg (190 lb)   02/10/21 91.6 kg (202 lb)         EKG: normal EKG, normal sinus rhythm, unchanged from previous tracings.

## 2022-05-09 ENCOUNTER — PATIENT MESSAGE (OUTPATIENT)
Dept: CARDIOLOGY CLINIC | Age: 52
End: 2022-05-09

## 2022-05-11 ENCOUNTER — TELEPHONE (OUTPATIENT)
Dept: CARDIOLOGY CLINIC | Age: 52
End: 2022-05-11

## 2022-05-11 ENCOUNTER — APPOINTMENT (OUTPATIENT)
Dept: CT IMAGING | Age: 52
End: 2022-05-11
Attending: EMERGENCY MEDICINE
Payer: MEDICARE

## 2022-05-11 ENCOUNTER — HOSPITAL ENCOUNTER (EMERGENCY)
Age: 52
Discharge: HOME | End: 2022-05-11
Attending: EMERGENCY MEDICINE
Payer: MEDICARE

## 2022-05-11 ENCOUNTER — APPOINTMENT (OUTPATIENT)
Dept: GENERAL RADIOLOGY | Age: 52
End: 2022-05-11
Attending: EMERGENCY MEDICINE
Payer: MEDICARE

## 2022-05-11 ENCOUNTER — OFFICE VISIT (OUTPATIENT)
Dept: CARDIOLOGY CLINIC | Age: 52
End: 2022-05-11
Payer: MEDICARE

## 2022-05-11 VITALS
HEIGHT: 65 IN | HEART RATE: 70 BPM | SYSTOLIC BLOOD PRESSURE: 105 MMHG | DIASTOLIC BLOOD PRESSURE: 62 MMHG | WEIGHT: 145 LBS | BODY MASS INDEX: 24.16 KG/M2 | RESPIRATION RATE: 16 BRPM | OXYGEN SATURATION: 100 % | TEMPERATURE: 98.2 F

## 2022-05-11 VITALS
DIASTOLIC BLOOD PRESSURE: 86 MMHG | WEIGHT: 146 LBS | SYSTOLIC BLOOD PRESSURE: 132 MMHG | HEIGHT: 66 IN | HEART RATE: 73 BPM | OXYGEN SATURATION: 100 % | BODY MASS INDEX: 23.46 KG/M2

## 2022-05-11 DIAGNOSIS — R06.02 SOB (SHORTNESS OF BREATH): ICD-10-CM

## 2022-05-11 DIAGNOSIS — R07.9 CHEST PAIN, UNSPECIFIED TYPE: Primary | ICD-10-CM

## 2022-05-11 LAB
ANION GAP SERPL CALC-SCNC: 6 MMOL/L (ref 3–18)
ATRIAL RATE: 87 BPM
BASOPHILS # BLD: 0 K/UL (ref 0–0.1)
BASOPHILS NFR BLD: 0 % (ref 0–2)
BNP SERPL-MCNC: 123 PG/ML (ref 0–900)
BUN SERPL-MCNC: 16 MG/DL (ref 7–18)
BUN/CREAT SERPL: 25 (ref 12–20)
CALCIUM SERPL-MCNC: 9.4 MG/DL (ref 8.5–10.1)
CALCULATED P AXIS, ECG09: 76 DEGREES
CALCULATED R AXIS, ECG10: 56 DEGREES
CALCULATED T AXIS, ECG11: 73 DEGREES
CHLORIDE SERPL-SCNC: 107 MMOL/L (ref 100–111)
CO2 SERPL-SCNC: 29 MMOL/L (ref 21–32)
CREAT SERPL-MCNC: 0.63 MG/DL (ref 0.6–1.3)
DIAGNOSIS, 93000: NORMAL
DIFFERENTIAL METHOD BLD: NORMAL
EOSINOPHIL # BLD: 0 K/UL (ref 0–0.4)
EOSINOPHIL NFR BLD: 1 % (ref 0–5)
ERYTHROCYTE [DISTWIDTH] IN BLOOD BY AUTOMATED COUNT: 12.4 % (ref 11.6–14.5)
GLUCOSE SERPL-MCNC: 157 MG/DL (ref 74–99)
HCT VFR BLD AUTO: 41 % (ref 35–45)
HGB BLD-MCNC: 13.4 G/DL (ref 12–16)
IMM GRANULOCYTES # BLD AUTO: 0 K/UL (ref 0–0.04)
IMM GRANULOCYTES NFR BLD AUTO: 0 % (ref 0–0.5)
LYMPHOCYTES # BLD: 2.5 K/UL (ref 0.9–3.6)
LYMPHOCYTES NFR BLD: 37 % (ref 21–52)
MAGNESIUM SERPL-MCNC: 1.8 MG/DL (ref 1.6–2.6)
MCH RBC QN AUTO: 29.8 PG (ref 24–34)
MCHC RBC AUTO-ENTMCNC: 32.7 G/DL (ref 31–37)
MCV RBC AUTO: 91.3 FL (ref 78–100)
MONOCYTES # BLD: 0.4 K/UL (ref 0.05–1.2)
MONOCYTES NFR BLD: 6 % (ref 3–10)
NEUTS SEG # BLD: 3.8 K/UL (ref 1.8–8)
NEUTS SEG NFR BLD: 56 % (ref 40–73)
NRBC # BLD: 0 K/UL (ref 0–0.01)
NRBC BLD-RTO: 0 PER 100 WBC
P-R INTERVAL, ECG05: 144 MS
PLATELET # BLD AUTO: 283 K/UL (ref 135–420)
PMV BLD AUTO: 10.1 FL (ref 9.2–11.8)
POTASSIUM SERPL-SCNC: 4.1 MMOL/L (ref 3.5–5.5)
Q-T INTERVAL, ECG07: 364 MS
QRS DURATION, ECG06: 74 MS
QTC CALCULATION (BEZET), ECG08: 438 MS
RBC # BLD AUTO: 4.49 M/UL (ref 4.2–5.3)
SODIUM SERPL-SCNC: 142 MMOL/L (ref 136–145)
TROPONIN-HIGH SENSITIVITY: 4 NG/L (ref 0–54)
VENTRICULAR RATE, ECG03: 87 BPM
WBC # BLD AUTO: 6.8 K/UL (ref 4.6–13.2)

## 2022-05-11 PROCEDURE — 84484 ASSAY OF TROPONIN QUANT: CPT

## 2022-05-11 PROCEDURE — 74011000636 HC RX REV CODE- 636: Performed by: EMERGENCY MEDICINE

## 2022-05-11 PROCEDURE — G8420 CALC BMI NORM PARAMETERS: HCPCS | Performed by: NURSE PRACTITIONER

## 2022-05-11 PROCEDURE — 3017F COLORECTAL CA SCREEN DOC REV: CPT | Performed by: NURSE PRACTITIONER

## 2022-05-11 PROCEDURE — 85025 COMPLETE CBC W/AUTO DIFF WBC: CPT

## 2022-05-11 PROCEDURE — 99285 EMERGENCY DEPT VISIT HI MDM: CPT

## 2022-05-11 PROCEDURE — 74011250636 HC RX REV CODE- 250/636: Performed by: EMERGENCY MEDICINE

## 2022-05-11 PROCEDURE — 99215 OFFICE O/P EST HI 40 MIN: CPT | Performed by: NURSE PRACTITIONER

## 2022-05-11 PROCEDURE — 93005 ELECTROCARDIOGRAM TRACING: CPT

## 2022-05-11 PROCEDURE — 80048 BASIC METABOLIC PNL TOTAL CA: CPT

## 2022-05-11 PROCEDURE — 96374 THER/PROPH/DIAG INJ IV PUSH: CPT

## 2022-05-11 PROCEDURE — 71046 X-RAY EXAM CHEST 2 VIEWS: CPT

## 2022-05-11 PROCEDURE — G8427 DOCREV CUR MEDS BY ELIG CLIN: HCPCS | Performed by: NURSE PRACTITIONER

## 2022-05-11 PROCEDURE — 83735 ASSAY OF MAGNESIUM: CPT

## 2022-05-11 PROCEDURE — 83880 ASSAY OF NATRIURETIC PEPTIDE: CPT

## 2022-05-11 PROCEDURE — 71275 CT ANGIOGRAPHY CHEST: CPT

## 2022-05-11 PROCEDURE — G8432 DEP SCR NOT DOC, RNG: HCPCS | Performed by: NURSE PRACTITIONER

## 2022-05-11 PROCEDURE — 74011250637 HC RX REV CODE- 250/637: Performed by: EMERGENCY MEDICINE

## 2022-05-11 RX ORDER — TRAMADOL HYDROCHLORIDE 50 MG/1
50 TABLET ORAL
COMMUNITY

## 2022-05-11 RX ORDER — MORPHINE SULFATE 4 MG/ML
4 INJECTION INTRAVENOUS
Status: COMPLETED | OUTPATIENT
Start: 2022-05-11 | End: 2022-05-11

## 2022-05-11 RX ORDER — GUAIFENESIN 100 MG/5ML
324 LIQUID (ML) ORAL
Status: COMPLETED | OUTPATIENT
Start: 2022-05-11 | End: 2022-05-11

## 2022-05-11 RX ORDER — CYCLOBENZAPRINE HCL 10 MG
TABLET ORAL AS NEEDED
COMMUNITY

## 2022-05-11 RX ADMIN — MORPHINE SULFATE 4 MG: 4 INJECTION, SOLUTION INTRAMUSCULAR; INTRAVENOUS at 14:01

## 2022-05-11 RX ADMIN — ASPIRIN 81 MG 324 MG: 81 TABLET ORAL at 13:15

## 2022-05-11 RX ADMIN — IOPAMIDOL 80 ML: 755 INJECTION, SOLUTION INTRAVENOUS at 14:44

## 2022-05-11 NOTE — ED PROVIDER NOTES
EMERGENCY DEPARTMENT HISTORY AND PHYSICAL EXAM    1:13 PM  Date: 5/11/2022  Patient Name: Jone Traore    History of Presenting Illness       History Provided By : patient   HPI: Jone Traore is a 47 y.o. female with past medical history of fibromyalgia, bariatric surgery, breast reduction, lipoma removal from right breast presents with central chest pain for a few weeks. Chest pain started at 7 AM today. Pain is worse with movement. Patient states that pain is now constant, moderate in severity, better with Lyrica and when she lies down. Denies any shortness of breath. Patient was seen by her cardiologist today and is scheduled for echo and stress test.  Denies any fever or cough. Patient states that her father has a history of coronary artery disease  Patient denies any leg swelling. PCP: Marylin Ledezma NP    Past History     Past Medical History:  Past Medical History:   Diagnosis Date    Fibromyalgia     Intestinal malabsorption     Raynaud phenomenon     S/P bariatric surgery     Sjogrens syndrome ([address]        Past Surgical History:  Past Surgical History:   Procedure Laterality Date    HX GYN      ABEBE single oophorectomy    HX HEENT      tonsils    HX ORTHOPAEDIC      left knee atrthroscopy, Rt rotator cuff, left elbow    [place] BREAST SURGERY PROCEDURE UNLISTED      breast reduction    [place] LAP GASTRIC BYPASS/REGULO-EN-Y      2/4/2009       Family History:  History reviewed. No pertinent family history. Social History:  Social History     Tobacco Use    Smoking status: Never Smoker    Smokeless tobacco: Never Used   Vaping Use    Vaping Use: Never used   Substance Use Topics    Alcohol use: Yes     Comment: social    Drug use: No       Allergies:   Allergies   Allergen Reactions    Erythromycin Anaphylaxis    Seldane-D Anaphylaxis     Coma     Sulfa (Sulfonamide Antibiotics) Anaphylaxis     Melva Stabs Syndrome       Review of Systems   Review of Systems   Constitutional: Negative for activity change, appetite change and chills. HENT: Negative for congestion, ear discharge, ear pain and sore throat. Eyes: Negative for photophobia and pain. Respiratory: Negative for cough and choking. Cardiovascular: Positive for chest pain. Negative for palpitations and leg swelling. Gastrointestinal: Negative for anal bleeding and rectal pain. Endocrine: Negative for polydipsia and polyuria. Genitourinary: Negative for genital sores and urgency. Musculoskeletal: Negative for arthralgias and myalgias. Neurological: Negative for dizziness, seizures and speech difficulty. Psychiatric/Behavioral: Negative for hallucinations, self-injury and suicidal ideas. Physical Exam     Patient Vitals for the past 12 hrs:   Temp Pulse Resp BP SpO2   05/11/22 1316 -- -- -- -- 99 %   05/11/22 1315 -- 79 21 127/74 100 %   05/11/22 1259 98.2 °F (36.8 °C) 82 12 139/71 100 %       Physical Exam  Vitals and nursing note reviewed. Constitutional:       Appearance: She is well-developed. HENT:      Head: Normocephalic and atraumatic. Eyes:      General:         Right eye: No discharge. Left eye: No discharge. Cardiovascular:      Rate and Rhythm: Normal rate and regular rhythm. Heart sounds: Normal heart sounds. No murmur heard. Pulmonary:      Effort: Pulmonary effort is normal. No respiratory distress. Breath sounds: Normal breath sounds. No stridor. No wheezing or rales. Chest:      Chest wall: No tenderness. Abdominal:      General: Bowel sounds are normal. There is no distension. Palpations: Abdomen is soft. Tenderness: There is no abdominal tenderness. There is no guarding or rebound. Musculoskeletal:         General: Normal range of motion. Cervical back: Normal range of motion and neck supple. Skin:     General: Skin is warm and dry. Neurological:      Mental Status: She is alert and oriented to person, place, and time. Diagnostic Study Results     Labs -  Recent Results (from the past 12 hour(s))   EKG, 12 LEAD, INITIAL    Collection Time: 05/11/22  1:02 PM   Result Value Ref Range    Ventricular Rate 87 BPM    Atrial Rate 87 BPM    P-R Interval 144 ms    QRS Duration 74 ms    Q-T Interval 364 ms    QTC Calculation (Bezet) 438 ms    Calculated P Axis 76 degrees    Calculated R Axis 56 degrees    Calculated T Axis 73 degrees    Diagnosis       Normal sinus rhythm  Normal ECG  When compared with ECG of 15-APR-2011 12:07,  Sinus rhythm has replaced Ectopic atrial rhythm     METABOLIC PANEL, BASIC    Collection Time: 05/11/22  1:06 PM   Result Value Ref Range    Sodium 142 136 - 145 mmol/L    Potassium 4.1 3.5 - 5.5 mmol/L    Chloride 107 100 - 111 mmol/L    CO2 29 21 - 32 mmol/L    Anion gap 6 3.0 - 18 mmol/L    Glucose 157 (H) 74 - 99 mg/dL    BUN 16 7.0 - 18 MG/DL    Creatinine 0.63 0.6 - 1.3 MG/DL    BUN/Creatinine ratio 25 (H) 12 - 20      GFR est AA >60 >60 ml/min/1.73m2    GFR est non-AA >60 >60 ml/min/1.73m2    Calcium 9.4 8.5 - 10.1 MG/DL   TROPONIN-HIGH SENSITIVITY    Collection Time: 05/11/22  1:06 PM   Result Value Ref Range    Troponin-High Sensitivity 4 0 - 54 ng/L   MAGNESIUM    Collection Time: 05/11/22  1:06 PM   Result Value Ref Range    Magnesium 1.8 1.6 - 2.6 mg/dL   NT-PRO BNP    Collection Time: 05/11/22  1:06 PM   Result Value Ref Range    NT pro- 0 - 900 PG/ML   CBC WITH AUTOMATED DIFF    Collection Time: 05/11/22  1:45 PM   Result Value Ref Range    WBC 6.8 4.6 - 13.2 K/uL    RBC 4.49 4.20 - 5.30 M/uL    HGB 13.4 12.0 - 16.0 g/dL    HCT 41.0 35.0 - 45.0 %    MCV 91.3 78.0 - 100.0 FL    MCH 29.8 24.0 - 34.0 PG    MCHC 32.7 31.0 - 37.0 g/dL    RDW 12.4 11.6 - 14.5 %    PLATELET 438 049 - 729 K/uL    MPV 10.1 9.2 - 11.8 FL    NRBC 0.0 0  WBC    ABSOLUTE NRBC 0.00 0.00 - 0.01 K/uL    NEUTROPHILS 56 40 - 73 %    LYMPHOCYTES 37 21 - 52 %    MONOCYTES 6 3 - 10 %    EOSINOPHILS 1 0 - 5 % BASOPHILS 0 0 - 2 %    IMMATURE GRANULOCYTES 0 0.0 - 0.5 %    ABS. NEUTROPHILS 3.8 1.8 - 8.0 K/UL    ABS. LYMPHOCYTES 2.5 0.9 - 3.6 K/UL    ABS. MONOCYTES 0.4 0.05 - 1.2 K/UL    ABS. EOSINOPHILS 0.0 0.0 - 0.4 K/UL    ABS. BASOPHILS 0.0 0.0 - 0.1 K/UL    ABS. IMM. GRANS. 0.0 0.00 - 0.04 K/UL    DF AUTOMATED         Radiologic Studies -   XR CHEST PA LAT    Result Date: 5/11/2022  1. No acute cardiopulmonary process. CTA CHEST W OR W WO CONT    Result Date: 5/11/2022  1. No CT evidence for central pulmonary embolism. 2. No pneumothorax, consolidation or pleural effusion. 3. Soft tissue nodule upper anterior mediastinum as above, presumed lymph node without any other lymph node enlargement. Not typical location for thymic lesion or thymoma. Unclear etiology and clinical significance         Medical Decision Making     ED Course: Progress Notes, Reevaluation, and Consults:    1:13 PM Initial assessment performed. The patients presenting problems have been discussed, and they/their family are in agreement with the care plan formulated and outlined with them. I have encouraged them to ask questions as they arise throughout their visit.       Provider Notes (Medical Decision Making):   Patient presents with central chest pain  Patient appears uncomfortable, vitals within normal limits  Patient given pain medication  Plan to obtain labs, imaging  Differential diagnosis: ACS, esophageal spasm, GERD, MSK pain, aortic dissection  Aortic dissection less likely given chronicity of symptoms  Old medical records reviewed:  EKG as interpreted by me: 87 normal sinus rhythm, no ST changes, normal.  Labs as interpreted by me:  No leukocytosis no electrolyte abnormality  Troponin high-sensitivity 4 no need to repeat because symptoms more than 3 hours since onset  X-ray chest no acute abnormality  Patient feels better on reassessment   We will obtain CTA to rule out PE  No PE no pneumothorax consolidation or pleural effusion  Soft tissue nodule on upper anterior mediastinum. Patient advised to follow-up with PMD for repeat CT  Advised follow-up with cardiology and GI if symptoms persist        Vital Signs-Reviewed the patient's vital signs. Reviewed pt's pulse ox reading. Records Reviewed: old medical records  -I am the first provider for this patient.  -I reviewed the vital signs, available nursing notes, past medical history, past surgical history, family history and social history. Current Outpatient Medications   Medication Sig Dispense Refill    cyclobenzaprine (FLEXERIL) 10 mg tablet Take  by mouth as needed for Muscle Spasm(s).  traMADoL (ULTRAM) 50 mg tablet Take 50 mg by mouth every six (6) hours as needed for Pain.  ferrous sulfate (Iron) 325 mg (65 mg iron) tablet Take  by mouth every Monday, Wednesday, Friday. MWF      metFORMIN (GLUCOPHAGE) 500 mg tablet Take  by mouth two (2) times daily (with meals).  methylphenidate ER 36 mg 24 hr tab Take 56 mg by mouth every morning.  pregabalin (Lyrica) 25 mg capsule Take 25 mg by mouth two (2) times a day. 25 mg in the am and afternoon and 150 mg at night      Cetirizine (ZyrTEC) 10 mg cap Take  by mouth.  multivitamin (ONE A DAY) tablet Take 1 Tab by mouth daily.  calcium-cholecalciferol, d3, (CALCIUM 600 + D) 600-125 mg-unit tab Take  by mouth.  ascorbic acid, vitamin C, (VITAMIN C) 250 mg tablet Take  by mouth.  cyanocobalamin (VITAMIN B-12) 1,000 mcg sublingual tablet Take 1,000 mcg by mouth daily.  cholecalciferol (VITAMIN D3) 1,000 unit tablet Take  by mouth daily.  B.infantis-B.ani-B.long-B.bifi (PROBIOTIC 4X) 10-15 mg TbEC Take  by mouth. Clinical Impression     Clinical Impression:   1. Chest pain, unspecified type        Disposition: This note was dictated utilizing voice recognition software which may lead to typographical errors. I apologize in advance if the situation occurs.   If questions arise please do not hesitate to contact me or call our department.     Allison Awan MD  1:13 PM

## 2022-05-11 NOTE — PROGRESS NOTES
Gela Leal presents today for evaluation of complaints of chest discomfort. She was just seen by Dr. Rohit Galvan on 4/22/22 and during that appointment, she complained of chest and throat pressure that resolves with rest.  He ordered a nuclear stress test and echo. The stress test is scheduled for 5/19/22 and the echo for 6/3/22. Over the past several weeks, the chest pressure has become more consistent and increased in intensity. Pressure radiates to her throat, accompanied by shortness of breath, occasional dizziness. States that she is unable to take a deep breath at times and then feels short of breath. Analgesics \"take the edge off\", lying down decreases the intensity at times, and she states that she does not notice the pain while sleeping. States that she has been unable to eat large amounts of food as she becomes nauseous. She is a 47year old female with family history of CAD (father with recent PCI/stent, paternal GF, maternal GF), obesity (s/p gastric bypass in 2009), benign chest tumor (lipoma, removed in Nov. 2021), breast reduction surgery (~ 1992), fibromyalgia. Admits to chest pain, tightness, pressure, heaviness, and palpitations. Denies shortness of breath at rest, dyspnea on exertion, orthopnea and PND. Denies abdominal bloating. Denies lightheadedness, admits to occasional dizziness, and denies syncope. Denies lower extremity edema and claudication. Admits to occasional nausea, no vomiting, diarrhea, melena, hematochezia. Admits to some abdominal discomfort at times. Denies hematuria, urgency, frequency. Denies fever, chills.         PMH:  Past Medical History:   Diagnosis Date    Fibromyalgia     Intestinal malabsorption     Raynaud phenomenon     S/P bariatric surgery     Sjogrens syndrome ([facility])        PSH:  Past Surgical History:   Procedure Laterality Date    HX GYN      ABEBE single oophorectomy    HX HEENT      tonsils    HX ORTHOPAEDIC      left knee atrthroscopy, Rt rotator cuff, left elbow    [place] BREAST SURGERY PROCEDURE UNLISTED      breast reduction    [place] LAP GASTRIC BYPASS/REGULO-EN-Y      2/4/2009       MEDS:  Current Outpatient Medications   Medication Sig    cyclobenzaprine (FLEXERIL) 10 mg tablet Take  by mouth as needed for Muscle Spasm(s).  traMADoL (ULTRAM) 50 mg tablet Take 50 mg by mouth every six (6) hours as needed for Pain.  ferrous sulfate (Iron) 325 mg (65 mg iron) tablet Take  by mouth every Monday, Wednesday, Friday. MWF    metFORMIN (GLUCOPHAGE) 500 mg tablet Take  by mouth two (2) times daily (with meals).  methylphenidate ER 36 mg 24 hr tab Take 56 mg by mouth every morning.  pregabalin (Lyrica) 25 mg capsule Take 25 mg by mouth two (2) times a day. 25 mg in the am and afternoon and 150 mg at night    Cetirizine (ZyrTEC) 10 mg cap Take  by mouth.  multivitamin (ONE A DAY) tablet Take 1 Tab by mouth daily.  calcium-cholecalciferol, d3, (CALCIUM 600 + D) 600-125 mg-unit tab Take  by mouth.  ascorbic acid, vitamin C, (VITAMIN C) 250 mg tablet Take  by mouth.  cyanocobalamin (VITAMIN B-12) 1,000 mcg sublingual tablet Take 1,000 mcg by mouth daily.  cholecalciferol (VITAMIN D3) 1,000 unit tablet Take  by mouth daily.  B.infantis-B.ani-B.long-B.bifi (PROBIOTIC 4X) 10-15 mg TbEC Take  by mouth. No current facility-administered medications for this visit. Allergies and Sensitivities:  Allergies   Allergen Reactions    Erythromycin Anaphylaxis    Seldane-D Anaphylaxis     Coma     Sulfa (Sulfonamide Antibiotics) Anaphylaxis     Dondra Nestle Syndrome       Family History:  No family history on file. Social History:  She  reports that she has never smoked. She has never used smokeless tobacco.  She  reports current alcohol use.       Physical:  Visit Vitals  /86 (BP 1 Location: Left upper arm, BP Patient Position: Sitting, BP Cuff Size: Adult)   Pulse 73   Ht 5' 6\" (1.676 m)   Wt 66.2 kg (146 lb)   SpO2 100%   BMI 23.57 kg/m²         Exam:  Neck:  Supple, no JVD, no carotid bruits  CV:  Normal S1 and  S2, no murmurs, rubs, or gallops noted  Lungs:  Clear to ausculation throughout, no wheezes or rales  Abd:  Soft, non-tender, non-distended with good bowel sounds. No hepatosplenomegaly  Extremities:  No edema      Data:  EKG:  Normal sinus rhythm      LABS:  Lab Results   Component Value Date/Time    Sodium 139 10/19/2017 11:20 PM    Potassium 3.7 10/19/2017 11:20 PM    Chloride 99 (L) 10/19/2017 11:20 PM    CO2 30 10/19/2017 11:20 PM    Glucose 121 (H) 10/19/2017 11:20 PM    BUN 11 10/19/2017 11:20 PM    Creatinine 0.69 10/19/2017 11:20 PM     Lab Results   Component Value Date/Time    Cholesterol, total 160 05/11/2010 09:45 AM    HDL Cholesterol 60 05/11/2010 09:45 AM    LDL, calculated 75.2 05/11/2010 09:45 AM    Triglyceride 124 05/11/2010 09:45 AM    CHOL/HDL Ratio 2.7 05/11/2010 09:45 AM     Lab Results   Component Value Date/Time    ALT (SGPT) 22 10/19/2017 11:20 PM         Impression/Plan:  1. Chest pain/pressure/heaviness/tightness  2. Obesity, s/p gastric bypass surgery in 2009  3. History of benign chest tumor (lipoma), s/p removal in Nov. 2021  4. Fibromyalgia  5. Strong family history of CAD    Ms. Lopez was seen today for evaluation of complaints of chest heaviness, pressure, tightness, pain that has progressively worsened over the past several weeks. It has increased in intensity and frequency and lasting for longer periods of time. The pressure radiates to her throat. She has experienced some shortness of breath and dizziness. At times, the pressure makes it hard for her to breathe and speak. She was seen by Dr. Gina Pedersen on 4/22/22 for complaints of chest and throat pressure and he ordered a nuclear stress test and echo. Both have been scheduled and the nuclear stress test has been moved up and scheduled for 5/19/22 (was previously scheduled for 6/3/22).     She called the office the other day and reported that the chest pressure had increased and ER evaluation was recommended. She was hesitant to go due to concerns about possible Covid exposure in the ED. She was then scheduled to return to the office today for further evaluation. Her EKG shows normal sinus rhythm and no ischemic changes. After a long discussion regarding her symptoms and her concerns regarding the increasing intensity of chest pressure and heaviness, ER evaluation was recommended as complete evaluation can be performed (cardiac enzymes, troponins, other labs, chest x-ray, CTA of the chest, etc). She was made aware that if troponins are positive and progressively increase, admission is likely and other cardiac testing (stress test and echo) can be done at that time. She agreed to go to the ED for further evaluation. Time: 50 minutes    She will follow-up with Dr. Rosa Lopez as scheduled and as needed. Rocio Hadley MSN, FNP-BC    Please note:  Portions of this chart were created with Dragon medical speech to text program.  Unrecognized errors may be present.

## 2022-05-11 NOTE — DISCHARGE INSTRUCTIONS
Please follow up with PMD about soft tissue nodule upper anterior mediastinum for repeat CT in few months  Please follow-up with cardiologist for stress test and echo  Please return if chest pain persists

## 2022-05-11 NOTE — PATIENT INSTRUCTIONS
To ED for further evaluation  Stress test and echo as scheduled  Follow-up with Dr. Henry Garg as scheduled and as needed

## 2022-05-11 NOTE — Clinical Note
[address] [facility] EMERGENCY DEPT  [id number] [id number] [phone]    Work/School Note    Date: 5/11/2022    To Whom It May concern:      Teo Campa was seen and treated today in the emergency room by the following provider(s):  Attending Provider: Celso Petersen MD.      Teo Campa is excused from work/school on 05/11/22. She is clear to return to work/school on 05/12/22.         Sincerely,          Keshia Curran MD

## 2022-05-11 NOTE — ED TRIAGE NOTES
Pt reports chest pressure, onset weeks. States was at cardiologist appt today and EKG normal today and referred here for further testing.

## 2022-05-13 LAB
ATRIAL RATE: 75 BPM
CALCULATED P AXIS, ECG09: 73 DEGREES
CALCULATED R AXIS, ECG10: 35 DEGREES
CALCULATED T AXIS, ECG11: 57 DEGREES
DIAGNOSIS, 93000: NORMAL
P-R INTERVAL, ECG05: 156 MS
Q-T INTERVAL, ECG07: 388 MS
QRS DURATION, ECG06: 78 MS
QTC CALCULATION (BEZET), ECG08: 433 MS
VENTRICULAR RATE, ECG03: 75 BPM

## 2022-05-18 ENCOUNTER — TELEPHONE (OUTPATIENT)
Dept: CARDIOLOGY CLINIC | Age: 52
End: 2022-05-18

## 2022-05-19 ENCOUNTER — TRANSCRIBE ORDER (OUTPATIENT)
Dept: SCHEDULING | Age: 52
End: 2022-05-19

## 2022-05-19 DIAGNOSIS — M51.26 DISPLACEMENT OF LUMBAR INTERVERTEBRAL DISC WITHOUT MYELOPATHY: Primary | ICD-10-CM

## 2022-05-24 ENCOUNTER — TELEPHONE (OUTPATIENT)
Dept: CARDIOLOGY CLINIC | Age: 52
End: 2022-05-24

## 2022-05-24 NOTE — TELEPHONE ENCOUNTER
Pt called, had questions about the nuclear stress test. Let her know that Dr. Kaylen Jay MD read nuclear test  results and they were as follows:    ----- Message from Kaylen Jay MD sent at 5/24/2022  1:49 PM EDT -----  Please make sure that patient knows that her nuclear scan is completely normal.      Pt happy with the results, reassured. Will keep appt for October, no earlier requests.

## 2022-06-02 ENCOUNTER — TELEPHONE (OUTPATIENT)
Dept: CARDIOLOGY CLINIC | Age: 52
End: 2022-06-02

## 2022-10-18 ENCOUNTER — OFFICE VISIT (OUTPATIENT)
Dept: CARDIOLOGY CLINIC | Age: 52
End: 2022-10-18
Payer: MEDICARE

## 2022-10-18 VITALS
BODY MASS INDEX: 27.99 KG/M2 | OXYGEN SATURATION: 90 % | SYSTOLIC BLOOD PRESSURE: 126 MMHG | HEIGHT: 65 IN | WEIGHT: 168 LBS | DIASTOLIC BLOOD PRESSURE: 74 MMHG | HEART RATE: 77 BPM

## 2022-10-18 DIAGNOSIS — R06.02 SOB (SHORTNESS OF BREATH): ICD-10-CM

## 2022-10-18 DIAGNOSIS — R07.9 CHEST PAIN, UNSPECIFIED TYPE: Primary | ICD-10-CM

## 2022-10-18 DIAGNOSIS — R00.2 PALPITATIONS: ICD-10-CM

## 2022-10-18 PROCEDURE — 93000 ELECTROCARDIOGRAM COMPLETE: CPT | Performed by: INTERNAL MEDICINE

## 2022-10-18 PROCEDURE — 99214 OFFICE O/P EST MOD 30 MIN: CPT | Performed by: INTERNAL MEDICINE

## 2022-10-18 PROCEDURE — G8427 DOCREV CUR MEDS BY ELIG CLIN: HCPCS | Performed by: INTERNAL MEDICINE

## 2022-10-18 PROCEDURE — 3017F COLORECTAL CA SCREEN DOC REV: CPT | Performed by: INTERNAL MEDICINE

## 2022-10-18 PROCEDURE — G8432 DEP SCR NOT DOC, RNG: HCPCS | Performed by: INTERNAL MEDICINE

## 2022-10-18 PROCEDURE — G8419 CALC BMI OUT NRM PARAM NOF/U: HCPCS | Performed by: INTERNAL MEDICINE

## 2022-10-18 NOTE — PROGRESS NOTES
HISTORY OF PRESENT ILLNESS  Chinmay Elizalde is a 47 y.o. female. ASSESSMENT and PLAN    Ms. Chinmay Elizalde has no prior documented history of coronary disease. However, she has strong family history of CAD. She denies any knowledge of diabetes mellitus, hypertension or hyperlipidemia. She denies tobacco use. She does have family history of coronary artery disease in her father. 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 She has history of obesity status post gastric bypass surgery 2009. Her initial weight was 280 pounds. She had lost weight 268 pounds. Around 1992, she had breast reduction surgery performed. In November 2021, she had diagnosis of benign chest tumor, lipoma. It was about golf ball size. She had it removed. Since that time, she has had slow recovery since liposuction was also involved. In May 2022, her nuclear scan showed EF greater than 70% with normal perfusion. Her chest CT a revealed no pulmonary embolism. In June 2022, her echocardiogram showed EF 55-60% without wall motion abnormality. CAD:   She has no documented history of CAD. Her chest pain is quite atypical.  It is likely more consistent with musculoskeletal etiology. Unfortunately, she cannot take NSAIDs because of previous gastric bypass surgery. Reassurance was given. BP:   Well controlled at 126/74. Rhythm:   Stable sinus at 77 bpm.  CHF:    There is no evidence of decompensated CHF noted. Weight:    Her weight today is 168 pounds. When I saw her back in April 2022, she weighed 145 pounds. Weight control has again been encouraged. Cholesterol:   Target LDL <90. Anti-platelet:   Because of prior gastric bypass surgery, she cannot take aspirin or NSAIDs. I will see her back annually. Encounter Diagnoses   Name Primary?     Chest pain, unspecified type Yes    SOB (shortness of breath)     Palpitations      current treatment plan is effective, no change in therapy  lab results and schedule of future lab studies reviewed with patient  reviewed diet, exercise and weight control  cardiovascular risk and specific lipid/LDL goals reviewed    Follow-up  Associated symptoms include chest pain. Pertinent negatives include no shortness of breath. Today, Ms. Lopez has no complaints of chest pains with physical exertion. She does have off-and-on chest discomfort not associated with physical exertion. There is some changes with manipulation. It may be consistent with costochondritis or scar tissue from her numerous surgeries on her torso. She denies any changes in her breathing status. She denies any changes in her exercise capacity. She denies any exertional related chest discomfort. She denies orthopnea or PND. She denies any palpitations or dizziness. Review of Systems   Respiratory:  Negative for shortness of breath. Cardiovascular:  Positive for chest pain. Negative for palpitations, orthopnea, claudication, leg swelling and PND. All other systems reviewed and are negative. Physical Exam  Vitals and nursing note reviewed. Constitutional:       Appearance: Normal appearance. HENT:      Head: Normocephalic. Eyes:      Conjunctiva/sclera: Conjunctivae normal.   Neck:      Vascular: No carotid bruit. Cardiovascular:      Rate and Rhythm: Normal rate and regular rhythm. Pulmonary:      Breath sounds: Normal breath sounds. Abdominal:      Palpations: Abdomen is soft. Musculoskeletal:         General: No swelling. Cervical back: No rigidity. Skin:     General: Skin is warm and dry. Neurological:      General: No focal deficit present. Mental Status: She is alert and oriented to person, place, and time.    Psychiatric:         Mood and Affect: Mood normal.         Behavior: Behavior normal.       PCP: Raad Boucher NP    Past Medical History:   Diagnosis Date    Fibromyalgia     Intestinal malabsorption     Raynaud phenomenon S/P bariatric surgery     Sjogrens syndrome ([facility] [address]        Past Surgical History:   Procedure Laterality Date    HX GYN      ABEBE single oophorectomy    HX HEENT      tonsils    HX ORTHOPAEDIC      left knee atrthroscopy, Rt rotator cuff, left elbow    MS BREAST SURGERY PROCEDURE UNLISTED      breast reduction    MS LAP GASTRIC BYPASS/REGULO-EN-Y      2/4/2009       Current Outpatient Medications   Medication Sig Dispense Refill    cyclobenzaprine (FLEXERIL) 10 mg tablet Take  by mouth as needed for Muscle Spasm(s). traMADoL (ULTRAM) 50 mg tablet Take 50 mg by mouth every six (6) hours as needed for Pain.      metFORMIN (GLUCOPHAGE) 500 mg tablet Take  by mouth two (2) times daily (with meals). methylphenidate ER 36 mg 24 hr tab Take 56 mg by mouth every morning. Cetirizine (ZyrTEC) 10 mg cap Take  by mouth.      multivitamin (ONE A DAY) tablet Take 1 Tab by mouth daily. calcium-cholecalciferol, d3, 600-125 mg-unit tab Take  by mouth. ascorbic acid, vitamin C, (VITAMIN C) 250 mg tablet Take  by mouth.      cyanocobalamin (VITAMIN B-12) 1,000 mcg sublingual tablet Take 1,000 mcg by mouth daily. cholecalciferol (VITAMIN D3) (1000 Units /25 mcg) tablet Take  by mouth daily. B.animalis,bifid,infantis,long 10-15 mg TbEC Take  by mouth.          The patient has a family history of    Social History     Tobacco Use    Smoking status: Never    Smokeless tobacco: Never   Vaping Use    Vaping Use: Never used   Substance Use Topics    Alcohol use: Yes     Comment: social    Drug use: No       Lab Results   Component Value Date/Time    Cholesterol, total 160 05/11/2010 09:45 AM    HDL Cholesterol 60 05/11/2010 09:45 AM    LDL, calculated 75.2 05/11/2010 09:45 AM    Triglyceride 124 05/11/2010 09:45 AM    CHOL/HDL Ratio 2.7 05/11/2010 09:45 AM        BP Readings from Last 3 Encounters:   10/18/22 126/74   06/03/22 120/70   05/19/22 120/70        Pulse Readings from Last 3 Encounters:   10/18/22 77 05/11/22 70   05/11/22 73       Wt Readings from Last 3 Encounters:   10/18/22 76.2 kg (168 lb)   06/03/22 65.8 kg (145 lb)   05/19/22 65.8 kg (145 lb)         EKG: unchanged from previous tracings, normal sinus rhythm, low voltage.

## 2022-10-18 NOTE — PROGRESS NOTES
Zack Goodrich presents today for   Chief Complaint   Patient presents with    Follow-up     6 months        Zack Goodrich preferred language for health care discussion is english/other. Is someone accompanying this pt?  and father     Is the patient using any DME equipment during [address]? no    Depression Screening:  3 most recent PHQ Screens 5/11/2022   Little interest or pleasure in doing things Not at all   Feeling down, depressed, irritable, or hopeless Not at all   Total Score PHQ 2 0       Learning Assessment:  Learning Assessment 4/22/2022   PRIMARY LEARNER Patient   PRIMARY LANGUAGE ENGLISH   LEARNER PREFERENCE PRIMARY DEMONSTRATION   ANSWERED BY patient   RELATIONSHIP SELF       Abuse Screening:  Abuse Screening Questionnaire 5/11/2022   Do you ever feel afraid of your partner? N   Are you in a relationship with someone who physically or mentally threatens you? N   Is it safe for you to go home? Y       Fall Risk  No flowsheet data found. Pt currently taking Anticoagulant therapy? no    Coordination of Care:  1. Have you been to the ER, urgent care clinic since your last visit? Hospitalized since your last visit? no    2. Have you seen or consulted any other health care providers outside of the [address] [place] [address] since your last visit? Include any pap smears or colon screening.  no

## 2023-01-31 DIAGNOSIS — M51.26 DISPLACEMENT OF LUMBAR INTERVERTEBRAL DISC WITHOUT MYELOPATHY: Primary | ICD-10-CM

## 2023-02-03 DIAGNOSIS — M51.26 DISPLACEMENT OF LUMBAR INTERVERTEBRAL DISC WITHOUT MYELOPATHY: Primary | ICD-10-CM

## (undated) DEVICE — (D)GLOVE SURG TRIFLX 7.5 PWD -- DISC BY MFR USE ITEM 102005

## (undated) DEVICE — GOWN ISOLATN REG BLU POLY UNISX W/ THMB LOOP

## (undated) DEVICE — MAJ-1414 SINGLE USE ADPATER BIOPSY VALV: Brand: SINGLE USE ADAPTOR BIOPSY VALVE

## (undated) DEVICE — STERILE POLYISOPRENE POWDER-FREE SURGICAL GLOVES: Brand: PROTEXIS

## (undated) DEVICE — DEVON™ KNEE AND BODY STRAP 60" X 3" (1.5 M X 7.6 CM): Brand: DEVON